# Patient Record
Sex: FEMALE | Race: WHITE | Employment: OTHER | URBAN - METROPOLITAN AREA
[De-identification: names, ages, dates, MRNs, and addresses within clinical notes are randomized per-mention and may not be internally consistent; named-entity substitution may affect disease eponyms.]

---

## 2017-07-11 ENCOUNTER — HOSPITAL ENCOUNTER (OUTPATIENT)
Dept: MRI IMAGING | Age: 67
Discharge: HOME | End: 2017-07-11
Payer: MEDICARE

## 2017-07-11 DIAGNOSIS — M54.16 LUMBAR RADICULOPATHY: ICD-10-CM

## 2017-07-11 DIAGNOSIS — M47.816 LUMBAR SPONDYLOSIS: ICD-10-CM

## 2017-07-11 PROCEDURE — 72148 MRI LUMBAR SPINE W/O DYE: CPT

## 2020-08-12 ENCOUNTER — HOSPITAL ENCOUNTER (OUTPATIENT)
Dept: GENERAL RADIOLOGY | Age: 70
Discharge: HOME | End: 2020-08-12
Payer: MEDICARE

## 2020-08-12 DIAGNOSIS — K62.5 HEMORRHAGE OF RECTUM AND ANUS: ICD-10-CM

## 2020-08-12 DIAGNOSIS — R19.7 DIARRHEA, UNSPECIFIED: ICD-10-CM

## 2020-08-12 DIAGNOSIS — K64.8 INTERNAL HEMORRHOIDS: ICD-10-CM

## 2020-08-12 PROCEDURE — 74018 RADEX ABDOMEN 1 VIEW: CPT

## 2020-10-03 ENCOUNTER — HOSPITAL ENCOUNTER (OUTPATIENT)
Dept: PREADMISSION TESTING | Age: 70
Discharge: HOME | End: 2020-10-03
Payer: MEDICARE

## 2020-10-03 PROCEDURE — 87635 SARS-COV-2 COVID-19 AMP PRB: CPT

## 2020-10-04 LAB
HEALTH STATUS, XMCV2T: NORMAL
SARS-COV-2, COV2NT: NOT DETECTED
SOURCE, COVRS: NORMAL
SPECIMEN SOURCE, FCOV2M: NORMAL
SPECIMEN TYPE, XMCV1T: NORMAL

## 2020-10-06 RX ORDER — ROSUVASTATIN CALCIUM 20 MG/1
20 TABLET, COATED ORAL
COMMUNITY

## 2020-10-06 RX ORDER — LEVOTHYROXINE AND LIOTHYRONINE 19; 4.5 UG/1; UG/1
30 TABLET ORAL DAILY
COMMUNITY

## 2020-10-06 RX ORDER — CETIRIZINE HCL 10 MG
10 TABLET ORAL DAILY
COMMUNITY

## 2020-10-07 ENCOUNTER — HOSPITAL ENCOUNTER (OUTPATIENT)
Age: 70
Setting detail: OUTPATIENT SURGERY
Discharge: HOME | End: 2020-10-07
Attending: INTERNAL MEDICINE | Admitting: INTERNAL MEDICINE
Payer: MEDICARE

## 2020-10-07 ENCOUNTER — ANESTHESIA EVENT (OUTPATIENT)
Dept: ENDOSCOPY | Age: 70
End: 2020-10-07
Payer: MEDICARE

## 2020-10-07 ENCOUNTER — ANESTHESIA (OUTPATIENT)
Dept: ENDOSCOPY | Age: 70
End: 2020-10-07
Payer: MEDICARE

## 2020-10-07 VITALS
BODY MASS INDEX: 25.19 KG/M2 | HEIGHT: 56 IN | WEIGHT: 112 LBS | RESPIRATION RATE: 20 BRPM | SYSTOLIC BLOOD PRESSURE: 155 MMHG | OXYGEN SATURATION: 96 % | DIASTOLIC BLOOD PRESSURE: 60 MMHG | HEART RATE: 72 BPM | TEMPERATURE: 97.8 F

## 2020-10-07 PROCEDURE — 74011250636 HC RX REV CODE- 250/636: Performed by: INTERNAL MEDICINE

## 2020-10-07 PROCEDURE — 88305 TISSUE EXAM BY PATHOLOGIST: CPT

## 2020-10-07 PROCEDURE — 74011250636 HC RX REV CODE- 250/636: Performed by: NURSE ANESTHETIST, CERTIFIED REGISTERED

## 2020-10-07 PROCEDURE — 74011250637 HC RX REV CODE- 250/637: Performed by: INTERNAL MEDICINE

## 2020-10-07 PROCEDURE — 76040000019: Performed by: INTERNAL MEDICINE

## 2020-10-07 PROCEDURE — 74011000250 HC RX REV CODE- 250: Performed by: NURSE ANESTHETIST, CERTIFIED REGISTERED

## 2020-10-07 PROCEDURE — 2709999900 HC NON-CHARGEABLE SUPPLY: Performed by: INTERNAL MEDICINE

## 2020-10-07 PROCEDURE — 76060000031 HC ANESTHESIA FIRST 0.5 HR: Performed by: INTERNAL MEDICINE

## 2020-10-07 RX ORDER — EPINEPHRINE 0.1 MG/ML
1 INJECTION INTRACARDIAC; INTRAVENOUS
Status: DISCONTINUED | OUTPATIENT
Start: 2020-10-07 | End: 2020-10-07 | Stop reason: HOSPADM

## 2020-10-07 RX ORDER — CHOLECALCIFEROL (VITAMIN D3) 50 MCG
CAPSULE ORAL
COMMUNITY

## 2020-10-07 RX ORDER — ATROPINE SULFATE 0.1 MG/ML
0.5 INJECTION INTRAVENOUS
Status: DISCONTINUED | OUTPATIENT
Start: 2020-10-07 | End: 2020-10-07 | Stop reason: HOSPADM

## 2020-10-07 RX ORDER — MIDAZOLAM HYDROCHLORIDE 1 MG/ML
.25-5 INJECTION, SOLUTION INTRAMUSCULAR; INTRAVENOUS
Status: DISCONTINUED | OUTPATIENT
Start: 2020-10-07 | End: 2020-10-07 | Stop reason: HOSPADM

## 2020-10-07 RX ORDER — SODIUM CHLORIDE 0.9 % (FLUSH) 0.9 %
5-40 SYRINGE (ML) INJECTION EVERY 8 HOURS
Status: DISCONTINUED | OUTPATIENT
Start: 2020-10-07 | End: 2020-10-07 | Stop reason: HOSPADM

## 2020-10-07 RX ORDER — LIDOCAINE HYDROCHLORIDE 20 MG/ML
INJECTION, SOLUTION EPIDURAL; INFILTRATION; INTRACAUDAL; PERINEURAL AS NEEDED
Status: DISCONTINUED | OUTPATIENT
Start: 2020-10-07 | End: 2020-10-07 | Stop reason: HOSPADM

## 2020-10-07 RX ORDER — NALOXONE HYDROCHLORIDE 0.4 MG/ML
0.4 INJECTION, SOLUTION INTRAMUSCULAR; INTRAVENOUS; SUBCUTANEOUS
Status: DISCONTINUED | OUTPATIENT
Start: 2020-10-07 | End: 2020-10-07 | Stop reason: HOSPADM

## 2020-10-07 RX ORDER — SODIUM CHLORIDE 9 MG/ML
75 INJECTION, SOLUTION INTRAVENOUS CONTINUOUS
Status: DISCONTINUED | OUTPATIENT
Start: 2020-10-07 | End: 2020-10-07 | Stop reason: HOSPADM

## 2020-10-07 RX ORDER — DEXTROMETHORPHAN/PSEUDOEPHED 2.5-7.5/.8
1.2 DROPS ORAL
Status: DISCONTINUED | OUTPATIENT
Start: 2020-10-07 | End: 2020-10-07 | Stop reason: HOSPADM

## 2020-10-07 RX ORDER — PROPOFOL 10 MG/ML
INJECTION, EMULSION INTRAVENOUS AS NEEDED
Status: DISCONTINUED | OUTPATIENT
Start: 2020-10-07 | End: 2020-10-07 | Stop reason: HOSPADM

## 2020-10-07 RX ORDER — FLUMAZENIL 0.1 MG/ML
0.2 INJECTION INTRAVENOUS
Status: DISCONTINUED | OUTPATIENT
Start: 2020-10-07 | End: 2020-10-07 | Stop reason: HOSPADM

## 2020-10-07 RX ORDER — SODIUM CHLORIDE 0.9 % (FLUSH) 0.9 %
5-40 SYRINGE (ML) INJECTION AS NEEDED
Status: DISCONTINUED | OUTPATIENT
Start: 2020-10-07 | End: 2020-10-07 | Stop reason: HOSPADM

## 2020-10-07 RX ADMIN — PROPOFOL 80 MG: 10 INJECTION, EMULSION INTRAVENOUS at 12:03

## 2020-10-07 RX ADMIN — PROPOFOL 50 MG: 10 INJECTION, EMULSION INTRAVENOUS at 12:09

## 2020-10-07 RX ADMIN — PROPOFOL 40 MG: 10 INJECTION, EMULSION INTRAVENOUS at 12:13

## 2020-10-07 RX ADMIN — LIDOCAINE HYDROCHLORIDE 60 MG: 20 INJECTION, SOLUTION EPIDURAL; INFILTRATION; INTRACAUDAL; PERINEURAL at 12:03

## 2020-10-07 RX ADMIN — Medication 80 MG: at 12:06

## 2020-10-07 RX ADMIN — SODIUM CHLORIDE 75 ML/HR: 900 INJECTION, SOLUTION INTRAVENOUS at 11:48

## 2020-10-07 NOTE — ANESTHESIA PREPROCEDURE EVALUATION
Relevant Problems   No relevant active problems       Anesthetic History   No history of anesthetic complications            Review of Systems / Medical History  Patient summary reviewed, nursing notes reviewed and pertinent labs reviewed    Pulmonary  Within defined limits        Smoker         Neuro/Psych   Within defined limits           Cardiovascular  Within defined limits                Exercise tolerance: >4 METS     GI/Hepatic/Renal  Within defined limits              Endo/Other  Within defined limits    Hypothyroidism       Other Findings              Physical Exam    Airway  Mallampati: II  TM Distance: 4 - 6 cm  Neck ROM: normal range of motion   Mouth opening: Normal     Cardiovascular  Regular rate and rhythm,  S1 and S2 normal,  no murmur, click, rub, or gallop             Dental    Dentition: Lower partial plate     Pulmonary  Breath sounds clear to auscultation               Abdominal  GI exam deferred       Other Findings            Anesthetic Plan    ASA: 2  Anesthesia type: general and total IV anesthesia          Induction: Intravenous  Anesthetic plan and risks discussed with: Patient      Propofol MAC

## 2020-10-07 NOTE — PROGRESS NOTES
[facility] immediately following procedure by Yolande Gallego

## 2020-10-07 NOTE — ANESTHESIA POSTPROCEDURE EVALUATION
Procedure(s):  COLONOSCOPY. general, total IV anesthesia    Anesthesia Post Evaluation        Patient location during evaluation: PACU  Note status: Adequate. Level of consciousness: responsive to verbal stimuli and sleepy but conscious  Pain management: satisfactory to patient  Airway patency: patent  Anesthetic complications: no  Cardiovascular status: acceptable  Respiratory status: acceptable  Hydration status: acceptable  Comments: +Post-Anesthesia Evaluation and Assessment    Patient: Chelsey Mansfield MRN: [id number]  SSN: [id number]   YOB: 1950  Age: 65 y.o. Sex: female      Cardiovascular Function/Vital Signs    BP (!) 155/60   Pulse 72   Temp 36.6 °C (97.8 °F)   Resp 20   Ht 4' 8\" (1.422 m)   Wt 50.8 kg (112 lb)   SpO2 96%   Breastfeeding No   BMI 25.11 kg/m²     Patient is status post Procedure(s):  COLONOSCOPY. Nausea/Vomiting: Controlled. Postoperative hydration reviewed and adequate. Pain:  Pain Scale 1: Numeric (0 - 10) (10/07/20 1239)  Pain Intensity 1: 0 (10/07/20 1239)   Managed. Neurological Status: At baseline. Mental Status and Level of Consciousness: Arousable. Pulmonary Status:   O2 Device: Room air (10/07/20 1234)   Adequate oxygenation and airway patent. Complications related to anesthesia: None    Post-anesthesia assessment completed. No concerns. Signed By: Leland Christian MD    10/7/2020  Post anesthesia nausea and vomiting:  controlled      INITIAL Post-op Vital signs:   Vitals Value Taken Time   /60 10/7/2020 12:39 PM   Temp 36.6 °C (97.8 °F) 10/7/2020 12:34 PM   Pulse 72 10/7/2020 12:39 PM   Resp 20 10/7/2020 12:39 PM   SpO2 94 % 10/7/2020 12:38 PM   Vitals shown include unvalidated device data.

## 2020-10-07 NOTE — H&P
Pre-endoscopy H and P    The patient was seen and examined in the room/pre-op holding area. The airway was assessed and documented. The problem list, past medical history, and medications were reviewed. There is no problem list on file for this patient. Social History     Socioeconomic History    Marital status: SINGLE     Spouse name: Not on file    Number of children: Not on file    Years of education: Not on file    Highest education level: Not on file   Occupational History    Not on file   Social Needs    Financial resource strain: Not on file    Food insecurity     Worry: Not on file     Inability: Not on file    Transportation needs     Medical: Not on file     Non-medical: Not on file   Tobacco Use    Smoking status: Current Every Day Smoker     Packs/day: 0.75    Smokeless tobacco: Never Used   Substance and Sexual Activity    Alcohol use: Yes     Alcohol/week: 4.0 standard drinks     Types: 4 Glasses of wine per week    Drug use: Not Currently    Sexual activity: Not on file   Lifestyle    Physical activity     Days per week: Not on file     Minutes per session: Not on file    Stress: Not on file   Relationships    Social connections     Talks on phone: Not on file     Gets together: Not on file     Attends Evangelical service: Not on file     Active member of club or organization: Not on file     Attends meetings of clubs or organizations: Not on file     Relationship status: Not on file    Intimate partner violence     Fear of current or ex partner: Not on file     Emotionally abused: Not on file     Physically abused: Not on file     Forced sexual activity: Not on file   Other Topics Concern    Not on file   Social History Narrative    Not on file     Past Medical History:   Diagnosis Date    High cholesterol     Thyroid disease          Prior to Admission Medications   Prescriptions Last Dose Informant Patient Reported? Taking?    B.infantis-B.ani-B.long-B.bifi (Probiotic 4X) 10-15 mg TbEC 10/6/2020 at Unknown time  Yes Yes   Sig: Take  by mouth. cetirizine (ZyrTEC) 10 mg tablet 10/7/2020 at Unknown time  Yes Yes   Sig: Take 10 mg by mouth daily. rosuvastatin (Crestor) 20 mg tablet 10/7/2020 at Unknown time  Yes Yes   Sig: Take 20 mg by mouth nightly. thyroid, Pork, (Mayflower Thyroid) 30 mg tablet 10/7/2020 at Unknown time  Yes Yes   Sig: Take 30 mg by mouth daily. Facility-Administered Medications: None           The review of systems is:  Negative  for shortness of breath or chest pain      The heart, lungs, and mental status were satisfactory for the administration of deep sedation and for the procedure. I discussed with the patient the objectives, risks, consequences and alternatives to the procedure.       Ava Tellez MD  10/7/2020  11:50 AM

## 2020-10-07 NOTE — PROCEDURES
Colonoscopy Procedure Note    Garth Lazaro  1950  [id number]    Indications:  Please see below. Pre-operative Diagnosis: DIARRHEA  HEMORRHAGE OF ANUS AND RECTUM  INTERNAL HEMORRHOIDS    Post-operative Diagnosis:   Diverticulosis,  Large internal hemorrhoids,  Rectal mucosal prolapse,  Ulcer  rectum    : Red Townsend MD    Referring Provider: Valdo Aguirre MD    Sedation:  MAC anesthesia Propofol        Procedure Details:    After detailed informed consent was obtained with all risks and benefits of procedure explained and preoperative exam completed, the patient was taken to the endoscopy suite and placed in the left lateral decubitus position. Upon sequential sedation as per above, a digital rectal exam was performed  And was normal.  The Olympus videocolonoscope  was inserted in the rectum and carefully advanced to the cecum, which was identified by the ileocecal valve and appendiceal orifice. The quality of preparation was good. The colonoscope was slowly withdrawn with careful evaluation between folds. Retroflexion in the rectum was not performed(see below). Findings:   · Scope passed to cecum. The colonic mucosa is normal appearing. I took mucosal biopsies to make sure she has no microscopic colitis. · Mild diverticulosis noted in the sigmoid. · Large internal hemorrhoids with ulcerations at the proximal edges(? Proctitis). I took a single biopsy to rule out stercoral ulcer. · Rectal mucosa is difficult to see as mucosa prolapses. Therapies:  biopsy of colon cecum, ascending colon, descending colon, sigmoid colon and rectum(ulcer)    Specimen:  Specimens were collected as described above and sent to pathology. Complications: None were encountered during the procedure. EBL:  None. Recommendations:     -Await pathology.  -Follow up with primary care physician.  -Follow up in the office as scheduled. -Treat hemorrhoids. Red Townsend MD  10/7/2020  12:20 PM

## 2020-10-07 NOTE — DISCHARGE INSTRUCTIONS
[place] Office: [phone]    Anh Bond  [id number]  1950    EGD/COLONOSCOPY DISCHARGE INSTRUCTIONS  Discomfort:  Sore throat- throat lozenges or warm salt water gargle  redness at IV site- apply warm compress to area; if redness or soreness persist- contact your physician  Gaseous discomfort- walking, belching will help relieve any discomfort  You may not operate a vehicle for 12 hours  You may not engage in an occupation involving machinery or appliances for rest of today. You may not drink alcoholic beverages for at least 12 hours  Avoid making any critical decisions for at least 24 hour  DIET  You may resume your regular diet - however -  remember your colon is empty and a heavy meal will produce gas. Avoid these foods:  fried / greasy foods, excessive carbonated drinks or too much caffeine  MEDICATIONS   Regarding Aspirin or Nonsteroidal medications specifically, please see below. ACTIVITY  You may resume your normal daily activities. Spend the remainder of the day resting -  avoid any strenuous activity. CALL M.D. ANY SIGN OF   Increasing pain, nausea, vomiting  Abdominal distension (swelling)  New increased bleeding (oral or rectal)  Fever (chills)  Pain in chest area  Bloody discharge from nose or mouth  Shortness of breath    You may not take any Advil, Aspirin, Ibuprofen, Motrin, Aleve, or Goodys for 7 days, ONLY  Tylenol as needed for pain. Follow-up Instructions:   Call  Red Armenta MD for any questions or concerns  Results of procedure / biopsy in 7 days   Telephone # [phone]      Follow-up Information    None

## 2020-10-07 NOTE — PROGRESS NOTES
Patient given verbal and written discharge instructions. Patient given opportunity to ask questions and answers given. Patient able to verbalize understanding of these instructions.

## 2021-09-24 ENCOUNTER — TRANSCRIBE ORDER (OUTPATIENT)
Dept: SCHEDULING | Age: 71
End: 2021-09-24

## 2021-09-24 DIAGNOSIS — I73.9 CLAUDICATION (HCC): Primary | ICD-10-CM

## 2021-10-18 ENCOUNTER — HOSPITAL ENCOUNTER (OUTPATIENT)
Dept: VASCULAR SURGERY | Age: 71
Discharge: HOME | End: 2021-10-18
Attending: PODIATRIST
Payer: MEDICARE

## 2021-10-18 DIAGNOSIS — I73.9 CLAUDICATION (HCC): ICD-10-CM

## 2021-10-18 PROCEDURE — 93923 UPR/LXTR ART STDY 3+ LVLS: CPT

## 2021-10-19 LAB
LEFT ABI: 0.96
LEFT ANTERIOR TIBIAL: 123 MMHG
LEFT ARM BP: 128 MMHG
LEFT POSTERIOR TIBIAL: 114 MMHG
RIGHT ABI: 1.06
RIGHT ANTERIOR TIBIAL: 124 MMHG
RIGHT ARM BP: 118 MMHG
RIGHT POSTERIOR TIBIAL: 136 MMHG

## 2023-04-07 ENCOUNTER — TRANSCRIBE ORDER (OUTPATIENT)
Dept: SCHEDULING | Age: 73
End: 2023-04-07

## 2023-04-17 ENCOUNTER — HOSPITAL ENCOUNTER (OUTPATIENT)
Dept: VASCULAR SURGERY | Age: 73
Discharge: HOME | End: 2023-04-17
Attending: PODIATRIST
Payer: MEDICARE

## 2023-04-17 DIAGNOSIS — I73.9 PERIPHERAL VASCULAR DISEASE, UNSPECIFIED (HCC): ICD-10-CM

## 2023-04-17 PROCEDURE — 93922 UPR/L XTREMITY ART 2 LEVELS: CPT

## 2023-04-19 LAB
LEFT ABI: 0.94
LEFT ARM BP: 130 MMHG
LEFT POSTERIOR TIBIAL: 122 MMHG
LEFT TBI: 0.95
LEFT TOE PRESSURE: 123 MMHG
RIGHT ABI: 0.98
RIGHT ARM BP: 123 MMHG
RIGHT POSTERIOR TIBIAL: 127 MMHG
RIGHT TBI: 0.88
RIGHT TOE PRESSURE: 114 MMHG
VAS LEFT DORSALIS PEDIS BP: 107 MMHG
VAS RIGHT DORSALIS PEDIS BP: 113 MMHG

## 2023-05-11 RX ORDER — LEVOTHYROXINE AND LIOTHYRONINE 19; 4.5 UG/1; UG/1
30 TABLET ORAL DAILY
COMMUNITY

## 2023-05-11 RX ORDER — ROSUVASTATIN CALCIUM 20 MG/1
20 TABLET, COATED ORAL NIGHTLY
COMMUNITY

## 2023-05-11 RX ORDER — CETIRIZINE HYDROCHLORIDE 10 MG/1
10 TABLET ORAL DAILY
COMMUNITY

## 2023-11-29 ENCOUNTER — HOSPITAL ENCOUNTER (OUTPATIENT)
Facility: HOSPITAL | Age: 73
Discharge: HOME | End: 2023-11-29
Attending: FAMILY MEDICINE
Payer: MEDICARE

## 2023-11-29 VITALS
DIASTOLIC BLOOD PRESSURE: 65 MMHG | TEMPERATURE: 98 F | HEART RATE: 94 BPM | SYSTOLIC BLOOD PRESSURE: 103 MMHG | RESPIRATION RATE: 15 BRPM

## 2023-11-29 DIAGNOSIS — L97.512 RIGHT FOOT ULCER, WITH FAT LAYER EXPOSED (HCC): ICD-10-CM

## 2023-11-29 DIAGNOSIS — L97.312 ANKLE ULCER, RIGHT, WITH FAT LAYER EXPOSED (HCC): Primary | ICD-10-CM

## 2023-11-29 DIAGNOSIS — F17.210 CIGARETTE NICOTINE DEPENDENCE WITHOUT COMPLICATION: ICD-10-CM

## 2023-11-29 PROBLEM — F17.200 NICOTINE DEPENDENCE: Status: ACTIVE | Noted: 2023-11-29

## 2023-11-29 PROCEDURE — 99203 OFFICE O/P NEW LOW 30 MIN: CPT

## 2023-11-29 PROCEDURE — 11042 DBRDMT SUBQ TIS 1ST 20SQCM/<: CPT

## 2023-11-29 RX ORDER — ALBUTEROL SULFATE 90 UG/1
2 AEROSOL, METERED RESPIRATORY (INHALATION) EVERY 6 HOURS PRN
COMMUNITY

## 2023-11-29 RX ORDER — CHOLECALCIFEROL (VITAMIN D3) 25 MCG
1 TABLET,CHEWABLE ORAL
COMMUNITY

## 2023-11-29 NOTE — PROGRESS NOTES
0.15 cm^2 11/29/23 1428   Wound Volume (cm^3) 0.015 cm^3 11/29/23 1428   Post-Procedure Length (cm) 0.5 cm 11/29/23 1502   Post-Procedure Width (cm) 0.3 cm 11/29/23 1502   Post-Procedure Depth (cm) 0.3 cm 11/29/23 1502   Post-Procedure Surface Area (cm^2) 0.15 cm^2 11/29/23 1502   Post-Procedure Volume (cm^3) 0.045 cm^3 11/29/23 1502   Wound Assessment Pink/red;Slough 11/29/23 1428   Drainage Amount Scant (moist but unmeasurable) 11/29/23 1428   Drainage Description Serous 11/29/23 1428   Odor None 11/29/23 1428   Mirian-wound Assessment Intact 11/29/23 1428   Margins Defined edges 11/29/23 1428   Wound Thickness Description not for Pressure Injury Full thickness 11/29/23 1428   Number of days: 0          -------    Past Medical History:   Diagnosis Date    High cholesterol     Thyroid disease      Past Surgical History:   Procedure Laterality Date    COLONOSCOPY N/A 10/7/2020    COLONOSCOPY performed by Harinder Keen MD at [facility] ENDOSCOPY    HYSTERECTOMY (CERVIX STATUS UNKNOWN)      ORTHOPEDIC SURGERY      carpal tunnel      History reviewed. No pertinent family history. Social History     Socioeconomic History    Marital status: Single     Spouse name: None    Number of children: None    Years of education: None    Highest education level: None   Tobacco Use    Smoking status: Every Day     Packs/day: .5     Types: Cigarettes    Smokeless tobacco: Never   Substance and Sexual Activity    Alcohol use: Yes     Alcohol/week: 4.0 standard drinks of alcohol    Drug use: Not Currently        Prior to Admission medications    Medication Sig Start Date End Date Taking?  Authorizing Provider   Cholecalciferol (VITAMIN D3) 125 MCG (5000 UT) TABS Take 1 tablet by mouth   Yes ProviderFranky MD   Cyanocobalamin (B-12) 3000 MCG CAPS Take 1 Dose by mouth   Yes Franky Carrillo MD   albuterol sulfate HFA (VENTOLIN HFA) 108 (90 Base) MCG/ACT inhaler Inhale 2 puffs into the lungs every 6 hours as needed for Wheezing

## 2023-11-29 NOTE — PATIENT INSTRUCTIONS
Discharge Instructions for  [address], [address]  Phone: [phone] Fax: [phone]    NAME:  Mitesh Butt  YOB: 1950  MEDICAL RECORD NUMBER:  [id number]  DATE:  November 29, 2023  WOUND CARE ORDERS:  Right ankle wounds - Cleanse with baby shampoo. Rinse and pat dry. Apply santyl ointment the thickness of a nickel to wound bed. Cover with dry gauze and secure with tape. Change every day. Activity:  [x] Elevate leg(s) above the level of the heart when sitting. [x] Avoid prolonged standing in one place. [x] Do no get dressing/wrap wet. Dietary:  [x] Diet as tolerated      [] Diabetic Diet            [] Increase Protein: examples (Meat, cheese, eggs, greek yogurt, fish, nuts)          [] Дмитрий Therapeutic Nutrition Powder    Return Appointment:  [x] Return Appointment: With Dr. Amy Lambert in 1 week. [] Nurse Visit :   [] Ordered tests:     Electronically signed Edwina Mckeon RN on 11/29/2023 at 3:03 PM     [address]: Should you experience any significant changes in your wound(s) or have questions about your wound care, please contact the [address] [place] [address] at 1 [facility] 8:00 am - 4:30. If you need help with your wound outside these hours and cannot wait until we are again available, contact your PCP or go to the hospital emergency room. PLEASE NOTE: IF YOU ARE UNABLE TO OBTAIN WOUND SUPPLIES, CONTINUE TO USE THE SUPPLIES YOU HAVE AVAILABLE UNTIL YOU ARE ABLE TO REACH US. IT IS MOST IMPORTANT TO KEEP THE WOUND COVERED AT ALL TIMES.      Physician Signature:_______________________    Date: ___________ Time:  ____________

## 2023-11-29 NOTE — FLOWSHEET NOTE
11/29/23 1428   Anesthetic   Anesthetic 4% Lidocaine Cream   Right Leg Edema Point of Measurement   Leg circumference 37 cm   Ankle circumference 22 cm   RLE Neurovascular Assessment   Capillary Refill Less than/Equal to 3 seconds   Color Appropriate for Ethnicity   Temperature Warm   RLE Sensation  Full sensation   R Pedal Pulse +2   LLE Neurovascular Assessment   Capillary Refill Less than/Equal to 3 seconds   Color Appropriate for Ethnicity   Temperature Warm   L Pedal Pulse +2   Wound 11/29/23 Ankle Right; Anterior   Date First Assessed/Time First Assessed: 11/29/23 1426   Present on Original Admission: Yes  Wound Approximate Age at First Assessment (Weeks): 40 weeks  Location: Ankle  Wound Location Orientation: Right; Anterior   Wound Image    Dressing Status Intact   Wound Cleansed Cleansed with saline   Offloading for Diabetic Foot Ulcers Offloading not ordered   Wound Length (cm) 0.6 cm   Wound Width (cm) 1 cm   Wound Depth (cm) 0.1 cm   Wound Surface Area (cm^2) 0.6 cm^2   Wound Volume (cm^3) 0.06 cm^3   Wound Assessment [place]/red;Slough   Drainage Amount Scant (moist but unmeasurable)   Drainage Description Serous   Mirian-wound Assessment Intact   Margins Defined edges   Wound Thickness Description not for Pressure Injury Full thickness   Wound 11/29/23 Foot Right; Anterior   Date First Assessed/Time First Assessed: 11/29/23 1427   Present on Original Admission: Yes  Wound Approximate Age at First Assessment (Weeks): 40 weeks  Location: Foot  Wound Location Orientation: Right; Anterior   Dressing Status Intact   Wound Cleansed Cleansed with saline   Offloading for Diabetic Foot Ulcers Offloading not ordered   Wound Length (cm) 0.5 cm   Wound Width (cm) 0.3 cm   Wound Depth (cm) 0.1 cm   Wound Surface Area (cm^2) 0.15 cm^2   Wound Volume (cm^3) 0.015 cm^3   Wound Assessment [place]/red;Slough   Drainage Amount Scant (moist but unmeasurable)   Drainage Description Serous   Odor None   Mirian-wound Assessment Intact

## 2023-11-30 RX ORDER — TRIAMCINOLONE ACETONIDE 1 MG/G
OINTMENT TOPICAL ONCE
OUTPATIENT
Start: 2023-11-30 | End: 2023-11-30

## 2023-11-30 RX ORDER — LIDOCAINE 50 MG/G
OINTMENT TOPICAL ONCE
OUTPATIENT
Start: 2023-11-30 | End: 2023-11-30

## 2023-11-30 RX ORDER — TRIAMCINOLONE ACETONIDE 1 MG/G
OINTMENT TOPICAL ONCE
Status: CANCELLED | OUTPATIENT
Start: 2023-11-30 | End: 2023-11-30

## 2023-11-30 RX ORDER — CLOBETASOL PROPIONATE 0.5 MG/G
OINTMENT TOPICAL ONCE
OUTPATIENT
Start: 2023-11-30 | End: 2023-11-30

## 2023-11-30 RX ORDER — BETAMETHASONE DIPROPIONATE 0.5 MG/G
CREAM TOPICAL ONCE
Status: CANCELLED | OUTPATIENT
Start: 2023-11-30 | End: 2023-11-30

## 2023-11-30 RX ORDER — LIDOCAINE HYDROCHLORIDE 20 MG/ML
JELLY TOPICAL ONCE
Status: CANCELLED | OUTPATIENT
Start: 2023-11-30 | End: 2023-11-30

## 2023-11-30 RX ORDER — LIDOCAINE 40 MG/G
CREAM TOPICAL ONCE
Status: CANCELLED | OUTPATIENT
Start: 2023-11-30 | End: 2023-11-30

## 2023-11-30 RX ORDER — IBUPROFEN 200 MG
TABLET ORAL ONCE
Status: CANCELLED | OUTPATIENT
Start: 2023-11-30 | End: 2023-11-30

## 2023-11-30 RX ORDER — GINSENG 100 MG
CAPSULE ORAL ONCE
Status: CANCELLED | OUTPATIENT
Start: 2023-11-30 | End: 2023-11-30

## 2023-11-30 RX ORDER — GINSENG 100 MG
CAPSULE ORAL ONCE
OUTPATIENT
Start: 2023-11-30 | End: 2023-11-30

## 2023-11-30 RX ORDER — SODIUM CHLOR/HYPOCHLOROUS ACID 0.033 %
SOLUTION, IRRIGATION IRRIGATION ONCE
Status: CANCELLED | OUTPATIENT
Start: 2023-11-30 | End: 2023-11-30

## 2023-11-30 RX ORDER — BACITRACIN ZINC AND POLYMYXIN B SULFATE 500; 1000 [USP'U]/G; [USP'U]/G
OINTMENT TOPICAL ONCE
OUTPATIENT
Start: 2023-11-30 | End: 2023-11-30

## 2023-11-30 RX ORDER — SODIUM CHLOR/HYPOCHLOROUS ACID 0.033 %
SOLUTION, IRRIGATION IRRIGATION ONCE
OUTPATIENT
Start: 2023-11-30 | End: 2023-11-30

## 2023-11-30 RX ORDER — LIDOCAINE HYDROCHLORIDE 20 MG/ML
JELLY TOPICAL ONCE
OUTPATIENT
Start: 2023-11-30 | End: 2023-11-30

## 2023-11-30 RX ORDER — BETAMETHASONE DIPROPIONATE 0.5 MG/G
CREAM TOPICAL ONCE
OUTPATIENT
Start: 2023-11-30 | End: 2023-11-30

## 2023-11-30 RX ORDER — LIDOCAINE 50 MG/G
OINTMENT TOPICAL ONCE
Status: CANCELLED | OUTPATIENT
Start: 2023-11-30 | End: 2023-11-30

## 2023-11-30 RX ORDER — BACITRACIN ZINC AND POLYMYXIN B SULFATE 500; 1000 [USP'U]/G; [USP'U]/G
OINTMENT TOPICAL ONCE
Status: CANCELLED | OUTPATIENT
Start: 2023-11-30 | End: 2023-11-30

## 2023-11-30 RX ORDER — GENTAMICIN SULFATE 1 MG/G
OINTMENT TOPICAL ONCE
Status: CANCELLED | OUTPATIENT
Start: 2023-11-30 | End: 2023-11-30

## 2023-11-30 RX ORDER — CLOBETASOL PROPIONATE 0.5 MG/G
OINTMENT TOPICAL ONCE
Status: CANCELLED | OUTPATIENT
Start: 2023-11-30 | End: 2023-11-30

## 2023-11-30 RX ORDER — LIDOCAINE HYDROCHLORIDE 40 MG/ML
SOLUTION TOPICAL ONCE
Status: CANCELLED | OUTPATIENT
Start: 2023-11-30 | End: 2023-11-30

## 2023-11-30 RX ORDER — LIDOCAINE 40 MG/G
CREAM TOPICAL ONCE
OUTPATIENT
Start: 2023-11-30 | End: 2023-11-30

## 2023-11-30 RX ORDER — GENTAMICIN SULFATE 1 MG/G
OINTMENT TOPICAL ONCE
OUTPATIENT
Start: 2023-11-30 | End: 2023-11-30

## 2023-11-30 RX ORDER — IBUPROFEN 200 MG
TABLET ORAL ONCE
OUTPATIENT
Start: 2023-11-30 | End: 2023-11-30

## 2023-11-30 RX ORDER — LIDOCAINE HYDROCHLORIDE 40 MG/ML
SOLUTION TOPICAL ONCE
OUTPATIENT
Start: 2023-11-30 | End: 2023-11-30

## 2023-12-06 ENCOUNTER — HOSPITAL ENCOUNTER (OUTPATIENT)
Facility: HOSPITAL | Age: 73
Discharge: HOME | End: 2023-12-06
Attending: FAMILY MEDICINE
Payer: MEDICARE

## 2023-12-06 VITALS
RESPIRATION RATE: 16 BRPM | TEMPERATURE: 98.2 F | DIASTOLIC BLOOD PRESSURE: 75 MMHG | SYSTOLIC BLOOD PRESSURE: 126 MMHG | HEART RATE: 94 BPM

## 2023-12-06 DIAGNOSIS — L97.512 RIGHT FOOT ULCER, WITH FAT LAYER EXPOSED (HCC): ICD-10-CM

## 2023-12-06 DIAGNOSIS — L97.312 ANKLE ULCER, RIGHT, WITH FAT LAYER EXPOSED (HCC): Primary | ICD-10-CM

## 2023-12-06 PROCEDURE — 11042 DBRDMT SUBQ TIS 1ST 20SQCM/<: CPT

## 2023-12-06 RX ORDER — LIDOCAINE HYDROCHLORIDE 20 MG/ML
JELLY TOPICAL ONCE
OUTPATIENT
Start: 2023-12-06 | End: 2023-12-06

## 2023-12-06 RX ORDER — BETAMETHASONE DIPROPIONATE 0.5 MG/G
CREAM TOPICAL ONCE
OUTPATIENT
Start: 2023-12-06 | End: 2023-12-06

## 2023-12-06 RX ORDER — CLOBETASOL PROPIONATE 0.5 MG/G
OINTMENT TOPICAL ONCE
OUTPATIENT
Start: 2023-12-06 | End: 2023-12-06

## 2023-12-06 RX ORDER — IBUPROFEN 200 MG
TABLET ORAL ONCE
OUTPATIENT
Start: 2023-12-06 | End: 2023-12-06

## 2023-12-06 RX ORDER — GINSENG 100 MG
CAPSULE ORAL ONCE
OUTPATIENT
Start: 2023-12-06 | End: 2023-12-06

## 2023-12-06 RX ORDER — BACITRACIN ZINC AND POLYMYXIN B SULFATE 500; 1000 [USP'U]/G; [USP'U]/G
OINTMENT TOPICAL ONCE
OUTPATIENT
Start: 2023-12-06 | End: 2023-12-06

## 2023-12-06 RX ORDER — LIDOCAINE 40 MG/G
CREAM TOPICAL ONCE
OUTPATIENT
Start: 2023-12-06 | End: 2023-12-06

## 2023-12-06 RX ORDER — LIDOCAINE HYDROCHLORIDE 40 MG/ML
SOLUTION TOPICAL ONCE
OUTPATIENT
Start: 2023-12-06 | End: 2023-12-06

## 2023-12-06 RX ORDER — LIDOCAINE 50 MG/G
OINTMENT TOPICAL ONCE
OUTPATIENT
Start: 2023-12-06 | End: 2023-12-06

## 2023-12-06 RX ORDER — GENTAMICIN SULFATE 1 MG/G
OINTMENT TOPICAL ONCE
OUTPATIENT
Start: 2023-12-06 | End: 2023-12-06

## 2023-12-06 RX ORDER — SODIUM CHLOR/HYPOCHLOROUS ACID 0.033 %
SOLUTION, IRRIGATION IRRIGATION ONCE
OUTPATIENT
Start: 2023-12-06 | End: 2023-12-06

## 2023-12-06 RX ORDER — TRIAMCINOLONE ACETONIDE 1 MG/G
OINTMENT TOPICAL ONCE
OUTPATIENT
Start: 2023-12-06 | End: 2023-12-06

## 2023-12-06 NOTE — DISCHARGE INSTRUCTIONS
Discharge Instructions/Wound Orders  [address], [address]   Telephone: [phone]    NAME:  Anu Rodríguez [facility] Street:  1950  MEDICAL RECORD NUMBER:  [id number]  DATE:  December 6, 2023  Wound Care Orders:  Right foot :Cleanse with soap and water , apply primary dressing Santyl applied Nickel thickness cover with secondary dressing   Gauze and Tape  . Pt./pcg/HH nurse to change (freq) Daily and as needed for compromise. F/U in 2 week. Activity:  [x] Activity as tolerated:     [] Remain off Work:       [] May return to full duty work:                                     [] Return to work with restrictions:  Dietary:  [x] Diet as tolerated      [] Diabetic Diet            [] Increase Protein: examples (Meat, cheese, eggs, greek yogurt, fish, nuts)          [] [address]  [] Other:  [] Dial a Dietician : Call [facility] [place] at [phone] enter code [phone] when prompted. M-F 9am-5pm EST. Return Appointment:  [x] Return Appointment: With Dr. Linda Doe in 2 [facility])  [] Ordered tests:    Electronically signed Beatrice Sharpe RN on 12/6/2023 at 2:39 PM     [address] [place] [address] Information: Should you experience any significant changes in your wound(s) or have questions about your wound care, please contact the [address] [place] [address] [facility] 8:00 am - 4:30. If you need help with your wound outside these hours and cannot wait until we are again available, contact your PCP or go to the hospital emergency room. PLEASE NOTE: IF YOU ARE UNABLE TO OBTAIN WOUND SUPPLIES, CONTINUE TO USE THE SUPPLIES YOU HAVE AVAILABLE UNTIL YOU ARE ABLE TO REACH US. IT IS MOST IMPORTANT TO KEEP THE WOUND COVERED AT ALL TIMES.      Physician Signature:_______________________    Date: ___________ Time:  ____________

## 2024-01-03 ENCOUNTER — HOSPITAL ENCOUNTER (OUTPATIENT)
Facility: HOSPITAL | Age: 74
Discharge: HOME | End: 2024-01-03
Attending: FAMILY MEDICINE
Payer: MEDICARE

## 2024-01-03 VITALS
SYSTOLIC BLOOD PRESSURE: 105 MMHG | RESPIRATION RATE: 15 BRPM | TEMPERATURE: 98 F | DIASTOLIC BLOOD PRESSURE: 70 MMHG | HEART RATE: 105 BPM

## 2024-01-03 DIAGNOSIS — L97.312 ANKLE ULCER, RIGHT, WITH FAT LAYER EXPOSED (HCC): Primary | ICD-10-CM

## 2024-01-03 DIAGNOSIS — L97.512 RIGHT FOOT ULCER, WITH FAT LAYER EXPOSED (HCC): ICD-10-CM

## 2024-01-03 PROCEDURE — 87070 CULTURE OTHR SPECIMN AEROBIC: CPT

## 2024-01-03 PROCEDURE — 87077 CULTURE AEROBIC IDENTIFY: CPT

## 2024-01-03 PROCEDURE — 11043 DBRDMT MUSC&/FSCA 1ST 20/<: CPT

## 2024-01-03 PROCEDURE — 87186 SC STD MICRODIL/AGAR DIL: CPT

## 2024-01-03 PROCEDURE — 87205 SMEAR GRAM STAIN: CPT

## 2024-01-03 PROCEDURE — 11042 DBRDMT SUBQ TIS 1ST 20SQCM/<: CPT

## 2024-01-03 RX ORDER — LIDOCAINE 50 MG/G
OINTMENT TOPICAL ONCE
OUTPATIENT
Start: 2024-01-03 | End: 2024-01-03

## 2024-01-03 RX ORDER — BACITRACIN ZINC AND POLYMYXIN B SULFATE 500; 1000 [USP'U]/G; [USP'U]/G
OINTMENT TOPICAL ONCE
OUTPATIENT
Start: 2024-01-03 | End: 2024-01-03

## 2024-01-03 RX ORDER — BETAMETHASONE DIPROPIONATE 0.5 MG/G
CREAM TOPICAL ONCE
OUTPATIENT
Start: 2024-01-03 | End: 2024-01-03

## 2024-01-03 RX ORDER — LIDOCAINE 40 MG/G
CREAM TOPICAL ONCE
OUTPATIENT
Start: 2024-01-03 | End: 2024-01-03

## 2024-01-03 RX ORDER — GENTAMICIN SULFATE 1 MG/G
OINTMENT TOPICAL ONCE
OUTPATIENT
Start: 2024-01-03 | End: 2024-01-03

## 2024-01-03 RX ORDER — DOXYCYCLINE HYCLATE 100 MG
100 TABLET ORAL 2 TIMES DAILY
Qty: 14 TABLET | Refills: 0 | Status: SHIPPED | OUTPATIENT
Start: 2024-01-03 | End: 2024-01-10

## 2024-01-03 RX ORDER — CLOBETASOL PROPIONATE 0.5 MG/G
OINTMENT TOPICAL ONCE
OUTPATIENT
Start: 2024-01-03 | End: 2024-01-03

## 2024-01-03 RX ORDER — LIDOCAINE HYDROCHLORIDE 20 MG/ML
JELLY TOPICAL ONCE
OUTPATIENT
Start: 2024-01-03 | End: 2024-01-03

## 2024-01-03 RX ORDER — TRIAMCINOLONE ACETONIDE 1 MG/G
OINTMENT TOPICAL ONCE
OUTPATIENT
Start: 2024-01-03 | End: 2024-01-03

## 2024-01-03 RX ORDER — SODIUM CHLOR/HYPOCHLOROUS ACID 0.033 %
SOLUTION, IRRIGATION IRRIGATION ONCE
OUTPATIENT
Start: 2024-01-03 | End: 2024-01-03

## 2024-01-03 RX ORDER — LIDOCAINE HYDROCHLORIDE 40 MG/ML
SOLUTION TOPICAL ONCE
OUTPATIENT
Start: 2024-01-03 | End: 2024-01-03

## 2024-01-03 RX ORDER — GINSENG 100 MG
CAPSULE ORAL ONCE
OUTPATIENT
Start: 2024-01-03 | End: 2024-01-03

## 2024-01-03 RX ORDER — IBUPROFEN 200 MG
TABLET ORAL ONCE
OUTPATIENT
Start: 2024-01-03 | End: 2024-01-03

## 2024-01-03 NOTE — FLOWSHEET NOTE
01/03/24 1659   Wound 11/29/23 Ankle Right;Anterior #1   Date First Assessed/Time First Assessed: 11/29/23 1426   Present on Original Admission: Yes  Wound Approximate Age at First Assessment (Weeks): 40 weeks  Location: Ankle  Wound Location Orientation: Right;Anterior  Wound Description (Comments): #1   Dressing/Treatment   (santyl, gauze, tape)   Offloading for Diabetic Foot Ulcers Offloading not ordered   Wound 11/29/23 Foot Right;Anterior   Date First Assessed/Time First Assessed: 11/29/23 1427   Present on Original Admission: Yes  Wound Approximate Age at First Assessment (Weeks): 40 weeks  Location: Foot  Wound Location Orientation: Right;Anterior   Dressing/Treatment   (santyl, gauze, tape)   Offloading for Diabetic Foot Ulcers Offloading not ordered

## 2024-01-03 NOTE — PROGRESS NOTES
Wound Center  Progress Note / Procedure Note      Chief Complaint:  Chanelle Clay is a 73 y.o. female  with Right foot and ankle wound since February 2023      Assessment/Plan     73 y.o. female with     -R ankle anterior chronic ulcer.  Full thickness  Bigger, more painful  Slough /granular  Necessitates debridement  for wound healing and to prevent/heal infection  Ulcer needs debridement- see note below    -R foot anterior chronic ulcer.  Full thickness  Bigger, more painful  All necrotic  Necessitates debridement  for wound healing and to prevent/heal infection  Ulcer needs debridement- see note below    Culture of both wounds done  Start doxycycline until results of culture avail    Leg swelling  Elevate  compression    -Nicotine dependence  Effects of nicotine on wound healing discussed      -Vascular  Normal ABIs/TBIs/ in April      Following discussed with patient / sister  Needs :  Serial debridement- prn    Good local wound care  Dressing:santyl  Frequency : once daily   Tubis x 2      Patient/  understood and agrees with plan. Questions answered.    Follow up with me in 2 week    Subjective:   Since last visit  More swelling - has been on her feet more  Also more sensitive      HPI:     Wound since Feb   Acquired from having laser therapy on feet for pain  Dr Mancini's office did not think it was due to laser  Being treated there since then  Not healing so came here  Has tried collagen, iodine and some other products    Has been on 2 course of abx, last one 4 weeks ago    History/Chart/Medications reviewed    Current wound care:See flowsheet  Offloading wound: Yes  Appetite: good  Wound associated pain: See flowsheet  Diabetic: no  Smoker: yes  ROS: no N/V/D, no T/chills; no local rash, no chest pain or shortness of breath, no headache or dizzyness      Objective:     Physical Exam:   See flowsheet / nursing notes for vitals  Vitals:    01/03/24 1415   BP: 105/70   Pulse: (!) 105   Resp: 15   Temp: 98

## 2024-01-03 NOTE — PATIENT INSTRUCTIONS
Discharge Instructions for  [facility]  [address] [address]  Phone: [phone] Fax: [phone]     NAME:  Chanelle Clay  YOB: 1950  MEDICAL RECORD NUMBER:  [id number]  DATE:  January 3, 2024    WOUND CARE ORDERS:  Right ankle wounds - Cleanse with baby shampoo. Rinse and pat dry. Apply santyl ointment the thickness of a nickel to wound bed. Cover with dry gauze and secure with tape. Apply double layer of tubigrip size E for mild compression. Change every day.      Activity:  [x] Elevate leg(s) above the level of the heart when sitting.  [x] Avoid prolonged standing in one place.   [x] Do no get dressing/wrap wet.             Dietary:  [x] Diet as tolerated      [] Diabetic Diet            [] Increase Protein: examples (Meat, cheese, eggs, greek yogurt, fish, nuts)          [] Дмитрий Therapeutic Nutrition Powder     Return Appointment:  [x] Return Appointment: With Dr. Eli Gill in 1 week.   [] Nurse Visit :   [] Ordered tests:      Electronically signed RODRICK MEYERS RN on 01/03/2024 at 2:41 PM      Wound Care Center Information: Should you experience any significant changes in your wound(s) or have questions about your wound care, please contact the [facility] at MONDAY - FRIDAY 8:00 am - 4:30.  If you need help with your wound outside these hours and cannot wait until we are again available, contact your PCP or go to the hospital emergency room.      PLEASE NOTE: IF YOU ARE UNABLE TO OBTAIN WOUND SUPPLIES, CONTINUE TO USE THE SUPPLIES YOU HAVE AVAILABLE UNTIL YOU ARE ABLE TO REACH US. IT IS MOST IMPORTANT TO KEEP THE WOUND COVERED AT ALL TIMES.     Physician Signature:_______________________     Date: ___________ Time:  ____________

## 2024-01-03 NOTE — FLOWSHEET NOTE
01/03/24 1408   Anesthetic   Anesthetic 4% Lidocaine Cream   RLE Neurovascular Assessment   Capillary Refill Less than/Equal to 3 seconds   Color Appropriate for Ethnicity   Temperature Warm   RLE Sensation  Full sensation   R Pedal Pulse +2   LLE Neurovascular Assessment   Capillary Refill Less than/Equal to 3 seconds   Color Appropriate for Ethnicity   Temperature Warm   L Pedal Pulse +2   Wound 11/29/23 Ankle Right;Anterior #1   Date First Assessed/Time First Assessed: 11/29/23 1426   Present on Original Admission: Yes  Wound Approximate Age at First Assessment (Weeks): 40 weeks  Location: Ankle  Wound Location Orientation: Right;Anterior  Wound Description (Comments): #1   Wound Image    Dressing Status Old drainage noted   Wound Cleansed Cleansed with saline   Offloading for Diabetic Foot Ulcers Offloading not ordered   Post-Procedure Length (cm) 0.5 cm   Post-Procedure Width (cm) 1.1 cm   Post-Procedure Depth (cm) 0.2 cm   Post-Procedure Surface Area (cm^2) 0.55 cm^2   Post-Procedure Volume (cm^3) 0.11 cm^3   Wound Assessment Pink/red   Drainage Amount Scant (moist but unmeasurable)   Drainage Description Serous   Odor None   Mirian-wound Assessment Blanchable erythema   Margins Defined edges   Wound Thickness Description not for Pressure Injury Partial thickness   Wound 11/29/23 Foot Right;Anterior   Date First Assessed/Time First Assessed: 11/29/23 1427   Present on Original Admission: Yes  Wound Approximate Age at First Assessment (Weeks): 40 weeks  Location: Foot  Wound Location Orientation: Right;Anterior   Wound Image    Dressing Status Old drainage noted   Wound Cleansed Cleansed with saline   Offloading for Diabetic Foot Ulcers Offloading not ordered   Wound Length (cm) 0.4 cm   Wound Width (cm) 0.4 cm   Wound Depth (cm) 0.2 cm   Wound Surface Area (cm^2) 0.16 cm^2   Change in Wound Size % (l*w) -6.67   Wound Volume (cm^3) 0.032 cm^3   Wound Healing % -113   Wound Assessment Pink/red   Drainage Amount

## 2024-01-06 LAB
BACTERIA SPEC CULT: ABNORMAL
BACTERIA SPEC CULT: ABNORMAL
GRAM STN SPEC: ABNORMAL
GRAM STN SPEC: ABNORMAL
SERVICE CMNT-IMP: ABNORMAL

## 2024-01-08 ENCOUNTER — TELEPHONE (OUTPATIENT)
Facility: HOSPITAL | Age: 74
End: 2024-01-08

## 2024-01-08 DIAGNOSIS — L97.312 ANKLE ULCER, RIGHT, WITH FAT LAYER EXPOSED (HCC): Primary | ICD-10-CM

## 2024-01-08 DIAGNOSIS — L97.512 RIGHT FOOT ULCER, WITH FAT LAYER EXPOSED (HCC): ICD-10-CM

## 2024-01-08 RX ORDER — CIPROFLOXACIN 500 MG/1
500 TABLET, FILM COATED ORAL 2 TIMES DAILY
Qty: 20 TABLET | Refills: 0 | Status: SHIPPED | OUTPATIENT
Start: 2024-01-08 | End: 2024-01-18

## 2024-01-08 NOTE — TELEPHONE ENCOUNTER
Called patient to inform to stop taking doxycycline and to  cipro from [facility]'s pharmacy. Informed patient to take cipro twice a day for 10 days. Patient verbalized understanding and stated she would  the prescription. No other needs or concerns at this time.

## 2024-01-10 ENCOUNTER — HOSPITAL ENCOUNTER (OUTPATIENT)
Facility: HOSPITAL | Age: 74
Discharge: HOME | End: 2024-01-10
Attending: FAMILY MEDICINE
Payer: MEDICARE

## 2024-01-10 VITALS
SYSTOLIC BLOOD PRESSURE: 105 MMHG | TEMPERATURE: 98.4 F | DIASTOLIC BLOOD PRESSURE: 68 MMHG | RESPIRATION RATE: 18 BRPM | HEART RATE: 98 BPM

## 2024-01-10 DIAGNOSIS — L97.512 RIGHT FOOT ULCER, WITH FAT LAYER EXPOSED (HCC): ICD-10-CM

## 2024-01-10 DIAGNOSIS — L97.312 ANKLE ULCER, RIGHT, WITH FAT LAYER EXPOSED (HCC): Primary | ICD-10-CM

## 2024-01-10 PROCEDURE — 11042 DBRDMT SUBQ TIS 1ST 20SQCM/<: CPT

## 2024-01-10 RX ORDER — LIDOCAINE 50 MG/G
OINTMENT TOPICAL ONCE
OUTPATIENT
Start: 2024-01-10 | End: 2024-01-10

## 2024-01-10 RX ORDER — SODIUM CHLOR/HYPOCHLOROUS ACID 0.033 %
SOLUTION, IRRIGATION IRRIGATION ONCE
OUTPATIENT
Start: 2024-01-10 | End: 2024-01-10

## 2024-01-10 RX ORDER — BACITRACIN ZINC AND POLYMYXIN B SULFATE 500; 1000 [USP'U]/G; [USP'U]/G
OINTMENT TOPICAL ONCE
OUTPATIENT
Start: 2024-01-10 | End: 2024-01-10

## 2024-01-10 RX ORDER — LIDOCAINE HYDROCHLORIDE 40 MG/ML
SOLUTION TOPICAL ONCE
OUTPATIENT
Start: 2024-01-10 | End: 2024-01-10

## 2024-01-10 RX ORDER — CLOBETASOL PROPIONATE 0.5 MG/G
OINTMENT TOPICAL ONCE
OUTPATIENT
Start: 2024-01-10 | End: 2024-01-10

## 2024-01-10 RX ORDER — GINSENG 100 MG
CAPSULE ORAL ONCE
OUTPATIENT
Start: 2024-01-10 | End: 2024-01-10

## 2024-01-10 RX ORDER — LIDOCAINE HYDROCHLORIDE 20 MG/ML
JELLY TOPICAL ONCE
OUTPATIENT
Start: 2024-01-10 | End: 2024-01-10

## 2024-01-10 RX ORDER — TRIAMCINOLONE ACETONIDE 1 MG/G
OINTMENT TOPICAL ONCE
OUTPATIENT
Start: 2024-01-10 | End: 2024-01-10

## 2024-01-10 RX ORDER — LIDOCAINE 40 MG/G
CREAM TOPICAL ONCE
OUTPATIENT
Start: 2024-01-10 | End: 2024-01-10

## 2024-01-10 RX ORDER — BETAMETHASONE DIPROPIONATE 0.5 MG/G
CREAM TOPICAL ONCE
OUTPATIENT
Start: 2024-01-10 | End: 2024-01-10

## 2024-01-10 RX ORDER — GENTAMICIN SULFATE 1 MG/G
OINTMENT TOPICAL ONCE
Status: COMPLETED | OUTPATIENT
Start: 2024-01-10 | End: 2024-01-10

## 2024-01-10 RX ORDER — IBUPROFEN 200 MG
TABLET ORAL ONCE
OUTPATIENT
Start: 2024-01-10 | End: 2024-01-10

## 2024-01-10 RX ORDER — GENTAMICIN SULFATE 1 MG/G
OINTMENT TOPICAL ONCE
OUTPATIENT
Start: 2024-01-10 | End: 2024-01-10

## 2024-01-10 RX ADMIN — GENTAMICIN SULFATE: 1 OINTMENT TOPICAL at 15:21

## 2024-01-10 ASSESSMENT — PAIN SCALES - GENERAL: PAINLEVEL_OUTOF10: 0

## 2024-01-10 NOTE — FLOWSHEET NOTE
01/10/24 1553   Right Leg Edema Point of Measurement   Compression Therapy 2 layer compression wrap   Wound 11/29/23 Foot Right;Anterior #2   Date First Assessed/Time First Assessed: 11/29/23 1427   Present on Original Admission: Yes  Wound Approximate Age at First Assessment (Weeks): 40 weeks  Location: Foot  Wound Location Orientation: Right;Anterior  Wound Description (Comments): #2   Dressing/Treatment   (gentamicin, cherise, gauze, two layer compression wrap)   Offloading for Diabetic Foot Ulcers Offloading not ordered   Wound 11/29/23 Ankle Right;Anterior #1   Date First Assessed/Time First Assessed: 11/29/23 1426   Present on Original Admission: Yes  Wound Approximate Age at First Assessment (Weeks): 40 weeks  Location: Ankle  Wound Location Orientation: Right;Anterior  Wound Description (Comments): #1   Dressing Status New dressing applied   Dressing/Treatment   (gentamicin, cherise, gauze, two layer compression wrap)   Offloading for Diabetic Foot Ulcers Offloading not ordered

## 2024-01-10 NOTE — PATIENT INSTRUCTIONS
Discharge Instructions for  [facility]  [address] [address]  Phone: [phone] Fax: [phone]     NAME:  Chanelle Clay  YOB: 1950  MEDICAL RECORD NUMBER:  [id number]  DATE:  January 10, 2024    WOUND CARE ORDERS:  Right ankle wounds - Cleanse with baby shampoo. Rinse and pat dry. Apply gentamicin ointment to wound bed. Cover with cherise. Cover with dry gauze. Place 2 layer calamine compression wrap. Change weekly in clinic.     Activity:  [x] Elevate leg(s) above the level of the heart when sitting.  [x] Avoid prolonged standing in one place.   [x] Do no get dressing/wrap wet.             Dietary:  [x] Diet as tolerated      [] Diabetic Diet            [] Increase Protein: examples (Meat, cheese, eggs, greek yogurt, fish, nuts)          [] Дмитрий Therapeutic Nutrition Powder     Return Appointment:  [x] Return Appointment: With Dr. Eli Gill in 1 week.   [] Nurse Visit :   [] Ordered tests:      Electronically signed RODRICK MEYERS RN on 01/10/2024 at 2:56 PM      Wound Care Center Information: Should you experience any significant changes in your wound(s) or have questions about your wound care, please contact the [facility] at MONDAY - FRIDAY 8:00 am - 4:30.  If you need help with your wound outside these hours and cannot wait until we are again available, contact your PCP or go to the hospital emergency room.      PLEASE NOTE: IF YOU ARE UNABLE TO OBTAIN WOUND SUPPLIES, CONTINUE TO USE THE SUPPLIES YOU HAVE AVAILABLE UNTIL YOU ARE ABLE TO REACH US. IT IS MOST IMPORTANT TO KEEP THE WOUND COVERED AT ALL TIMES.     Physician Signature:_______________________     Date: ___________ Time:  ____________

## 2024-01-10 NOTE — FLOWSHEET NOTE
01/10/24 1422   Right Leg Edema Point of Measurement   Leg circumference 36 cm   Ankle circumference 20 cm   RLE Neurovascular Assessment   Capillary Refill Less than/Equal to 3 seconds   Color Appropriate for Ethnicity   Temperature Warm   RLE Sensation  Full sensation   R Pedal Pulse +2   Wound 11/29/23 Foot Right;Anterior #2   Date First Assessed/Time First Assessed: 11/29/23 1427   Present on Original Admission: Yes  Wound Approximate Age at First Assessment (Weeks): 40 weeks  Location: Foot  Wound Location Orientation: Right;Anterior  Wound Description (Comments): #2   Wound Image    Wound Length (cm) 0.5 cm   Wound Width (cm) 0.3 cm   Wound Depth (cm) 0.3 cm   Wound Surface Area (cm^2) 0.15 cm^2   Change in Wound Size % (l*w) 0   Wound Volume (cm^3) 0.045 cm^3   Wound Healing % -200   Wound Assessment [place]/red;Slough   Drainage Amount None (dry)   Drainage Description Serous   Odor None   Mirian-wound Assessment Blanchable erythema   Margins Defined edges   Wound Thickness Description not for Pressure Injury Full thickness   Wound 11/29/23 Ankle Right;Anterior #1   Date First Assessed/Time First Assessed: 11/29/23 1426   Present on Original Admission: Yes  Wound Approximate Age at First Assessment (Weeks): 40 weeks  Location: Ankle  Wound Location Orientation: Right;Anterior  Wound Description (Comments): #1   Wound Image    Wound Length (cm) 0.4 cm   Wound Width (cm) 0.3 cm   Wound Depth (cm) 0.2 cm   Wound Surface Area (cm^2) 0.12 cm^2   Change in Wound Size % (l*w) 80   Wound Volume (cm^3) 0.024 cm^3   Wound Healing % 60   Wound Assessment Pink/red   Drainage Amount Scant (moist but unmeasurable)   Drainage Description Serous   Odor None   Mirian-wound Assessment Blanchable erythema   Margins Flat/open edges   Wound Thickness Description not for Pressure Injury Partial thickness     /68   Pulse 98   Temp 98.4 °F (36.9 °C) (Temporal)   Resp 18

## 2024-01-10 NOTE — PROGRESS NOTES
(36.9 °C)     General: NAD. Hygiene good  Psych: cooperative. No anxiety or depression. Normal mood and affect.  Neuro: alert and oriented to person/place/situation. Otherwise nonfocal.  Derm: Normal  turgor for age, dry skin  HEENT: Normocephalic, atraumatic. EOMI. Conjunctiva clear. No scleral icterus.  Neck: Normal range of motion. No masses.  Chest: Respirations nonlabored  Lower extremities: color normal; temperature normal. Hair growth is not present. Calves are supple, nontender, approximately equally sized in comparison. Capillary refill <3 sec  Focussed Lower Extremity Exam:  Vascular exam:  RIGHT lower extremity: mild  edema, foot warm,   DP pulse : 2+  PT pulse: 1+  LEFT lower extremity: absent  edema, foot warm,   DP pulse : 2+  PT pulse: 1+       Ulcer Description:   See Flowsheet           Data Review:     04/19/2023   · The right resting POLLY is normal.  · The left resting POLLY is mildly decreased.  · Right toe PPG is normal. Left toe PPG is normal.  · Normal PVR waveforms bilaterally.    POLLY  The right resting POLLY is normal. The left resting POLLY is mildly decreased. PVR waveforms in the right lower thigh are normal. PVR waveforms in the right calf are normal. PVR waveforms in the right ankle are normal. PVR waveforms at the right metatarsal region are normal. PVR waveforms in the left lower thigh are normal. PVR waveforms in the left calf are normal. PVR waveforms in the left ankle are normal. PVR waveforms of the left metatarsal region are normal. Right toe PPG is normal. Left toe PPG is normal.           Procedure:     Ulcer assessment: Due to presence of necrotic tissue within the wound bed, ulcer requires debridement.    Procedure: Debridement:   The indication for debridement was reviewed with patient. Risks of procedure (bleeding, infection, pain) were discussed with patient/ and consent signed on first visit. Questions were answered      Subcutaneous excisional debridement R ankle ulcer

## 2024-01-17 ENCOUNTER — HOSPITAL ENCOUNTER (OUTPATIENT)
Facility: HOSPITAL | Age: 74
Discharge: HOME | End: 2024-01-17
Attending: FAMILY MEDICINE
Payer: MEDICARE

## 2024-01-17 VITALS
TEMPERATURE: 97.9 F | SYSTOLIC BLOOD PRESSURE: 91 MMHG | HEART RATE: 98 BPM | RESPIRATION RATE: 20 BRPM | DIASTOLIC BLOOD PRESSURE: 66 MMHG

## 2024-01-17 DIAGNOSIS — L97.312 ANKLE ULCER, RIGHT, WITH FAT LAYER EXPOSED (HCC): Primary | ICD-10-CM

## 2024-01-17 DIAGNOSIS — L97.512 RIGHT FOOT ULCER, WITH FAT LAYER EXPOSED (HCC): ICD-10-CM

## 2024-01-17 PROCEDURE — 11042 DBRDMT SUBQ TIS 1ST 20SQCM/<: CPT

## 2024-01-17 RX ORDER — BETAMETHASONE DIPROPIONATE 0.5 MG/G
CREAM TOPICAL ONCE
OUTPATIENT
Start: 2024-01-17 | End: 2024-01-17

## 2024-01-17 RX ORDER — LIDOCAINE HYDROCHLORIDE 20 MG/ML
JELLY TOPICAL ONCE
OUTPATIENT
Start: 2024-01-17 | End: 2024-01-17

## 2024-01-17 RX ORDER — GINSENG 100 MG
CAPSULE ORAL ONCE
OUTPATIENT
Start: 2024-01-17 | End: 2024-01-17

## 2024-01-17 RX ORDER — BACITRACIN ZINC AND POLYMYXIN B SULFATE 500; 1000 [USP'U]/G; [USP'U]/G
OINTMENT TOPICAL ONCE
OUTPATIENT
Start: 2024-01-17 | End: 2024-01-17

## 2024-01-17 RX ORDER — LIDOCAINE HYDROCHLORIDE 40 MG/ML
SOLUTION TOPICAL ONCE
OUTPATIENT
Start: 2024-01-17 | End: 2024-01-17

## 2024-01-17 RX ORDER — CLOBETASOL PROPIONATE 0.5 MG/G
OINTMENT TOPICAL ONCE
OUTPATIENT
Start: 2024-01-17 | End: 2024-01-17

## 2024-01-17 RX ORDER — IBUPROFEN 200 MG
TABLET ORAL ONCE
OUTPATIENT
Start: 2024-01-17 | End: 2024-01-17

## 2024-01-17 RX ORDER — SODIUM CHLOR/HYPOCHLOROUS ACID 0.033 %
SOLUTION, IRRIGATION IRRIGATION ONCE
OUTPATIENT
Start: 2024-01-17 | End: 2024-01-17

## 2024-01-17 RX ORDER — GENTAMICIN SULFATE 1 MG/G
OINTMENT TOPICAL ONCE
OUTPATIENT
Start: 2024-01-17 | End: 2024-01-17

## 2024-01-17 RX ORDER — LIDOCAINE 40 MG/G
CREAM TOPICAL ONCE
OUTPATIENT
Start: 2024-01-17 | End: 2024-01-17

## 2024-01-17 RX ORDER — LIDOCAINE 50 MG/G
OINTMENT TOPICAL ONCE
OUTPATIENT
Start: 2024-01-17 | End: 2024-01-17

## 2024-01-17 RX ORDER — TRIAMCINOLONE ACETONIDE 1 MG/G
OINTMENT TOPICAL ONCE
OUTPATIENT
Start: 2024-01-17 | End: 2024-01-17

## 2024-01-17 NOTE — FLOWSHEET NOTE
01/17/24 1627   Right Leg Edema Point of Measurement   Compression Therapy 2 layer compression wrap   Wound 11/29/23 Foot Right;Anterior #2   Date First Assessed/Time First Assessed: 11/29/23 1427   Present on Original Admission: Yes  Wound Approximate Age at First Assessment (Weeks): 40 weeks  Location: Foot  Wound Location Orientation: Right;Anterior  Wound Description (Comments): #2   Dressing/Treatment   (santyl, cherise, gauze, two layer calamine compression wrap)   Offloading for Diabetic Foot Ulcers Offloading not ordered

## 2024-01-17 NOTE — FLOWSHEET NOTE
01/17/24 1509   Anesthetic   Anesthetic 4% Lidocaine Cream   Wound 11/29/23 Ankle Right;Anterior #1   Date First Assessed/Time First Assessed: 11/29/23 1426   Present on Original Admission: Yes  Wound Approximate Age at First Assessment (Weeks): 40 weeks  Location: Ankle  Wound Location Orientation: Right;Anterior  Wound Description (Comments): #1   Wound Image    Dressing Status Intact   Wound Length (cm) 0.1 cm   Wound Width (cm) 0.1 cm   Wound Depth (cm) 0.1 cm   Wound Surface Area (cm^2) 0.01 cm^2   Change in Wound Size % (l*w) 98.33   Wound Volume (cm^3) 0.001 cm^3   Wound Healing % 98   Wound Assessment Epithelialization   Drainage Amount None (dry)   Odor None   Mirian-wound Assessment Intact   Margins Attached edges   Wound Thickness Description not for Pressure Injury Partial thickness   Wound 11/29/23 Foot Right;Anterior #2   Date First Assessed/Time First Assessed: 11/29/23 1427   Present on Original Admission: Yes  Wound Approximate Age at First Assessment (Weeks): 40 weeks  Location: Foot  Wound Location Orientation: Right;Anterior  Wound Description (Comments): #2   Wound Image    Wound Length (cm) 0.1 cm   Wound Width (cm) 0.1 cm   Wound Depth (cm) 0.1 cm   Wound Surface Area (cm^2) 0.01 cm^2   Change in Wound Size % (l*w) 93.33   Wound Volume (cm^3) 0.001 cm^3   Wound Healing % 93   Wound Assessment Epithelialization   Drainage Amount None (dry)   Odor None   Margins Attached edges   Wound Thickness Description not for Pressure Injury Partial thickness     BP 91/66   Pulse 98   Temp 97.9 °F (36.6 °C) (Tympanic)   Resp 20

## 2024-01-17 NOTE — PATIENT INSTRUCTIONS
Discharge Instructions for  [facility]  [address] [address]  Phone: [phone] Fax: [phone]     NAME:  Chanelle Clay  YOB: 1950  MEDICAL RECORD NUMBER:  [id number]  DATE:  January 17, 2024    WOUND CARE ORDERS:  Right ankle wound - Cleanse with baby shampoo. Rinse and pat dry. Apply gentamicin ointment to wound bed. Cover with cherise. Cover with dry gauze. Place 2 layer calamine compression wrap. Change weekly in clinic.     Activity:  [x] Elevate leg(s) above the level of the heart when sitting.  [x] Avoid prolonged standing in one place.   [x] Do no get dressing/wrap wet.             Dietary:  [x] Diet as tolerated      [] Diabetic Diet            [] Increase Protein: examples (Meat, cheese, eggs, greek yogurt, fish, nuts)          [] Дмитрий Therapeutic Nutrition Powder     Return Appointment:  [x] Return Appointment: With Dr. Eli Gill in 1 week.   [] Nurse Visit :   [] Ordered tests:      Electronically signed RODRICK MEYERS RN on 01/17/2024 at 3:33 PM      Wound Care Center Information: Should you experience any significant changes in your wound(s) or have questions about your wound care, please contact the [facility] at MONDAY - FRIDAY 8:00 am - 4:30.  If you need help with your wound outside these hours and cannot wait until we are again available, contact your PCP or go to the hospital emergency room.      PLEASE NOTE: IF YOU ARE UNABLE TO OBTAIN WOUND SUPPLIES, CONTINUE TO USE THE SUPPLIES YOU HAVE AVAILABLE UNTIL YOU ARE ABLE TO REACH US. IT IS MOST IMPORTANT TO KEEP THE WOUND COVERED AT ALL TIMES.     Physician Signature:_______________________     Date: ___________ Time:  ____________

## 2024-01-17 NOTE — PROGRESS NOTES
Wound Center  Progress Note / Procedure Note      Chief Complaint:  Chanelle Clay is a 73 y.o. female  with Right foot and ankle wound since February 2023      Assessment/Plan     73 y.o. female with     -R ankle anterior chronic ulcer.  Full thickness  healed    -R foot anterior chronic ulcer.  Full thickness  Thinning necrotic layer  All necrotic  Necessitates debridement  for wound healing and to prevent/heal infection  Ulcer needs debridement- see note below    Culture reviewed   Doxy d/c'd, started on cipro  Patient did not take concern for kidney- has only one kidney  Reviewed GFR>60  Still hesitant.   Since light growth-   Debridement will suffice.  To defer abx for now    Leg swelling  Elevate  compression    -Nicotine dependence  Effects of nicotine on wound healing discussed      -Vascular  Normal ABIs/TBIs/ in April      Following discussed with patient / sister  Needs :  Serial debridement- prn    Good local wound care  Dressing: gentamicin, collagen  Frequency : once weekly        Patient/  understood and agrees with plan. Questions answered.    Follow up with me in 1 week    Subjective:   Since last visit  Concern re cipro- did not start      HPI:     Wound since Feb   Acquired from having laser therapy on feet for pain  Dr Mancini's office did not think it was due to laser  Being treated there since then  Not healing so came here  Has tried collagen, iodine and some other products    Has been on 2 course of abx, last one 4 weeks ago    History/Chart/Medications reviewed    Current wound care:See flowsheet  Offloading wound: Yes  Appetite: good  Wound associated pain: See flowsheet  Diabetic: no  Smoker: yes  ROS: no N/V/D, no T/chills; no local rash, no chest pain or shortness of breath, no headache or dizzyness      Objective:     Physical Exam:   See flowsheet / nursing notes for vitals  Vitals:    01/17/24 1506   BP: 91/66   Pulse: 98   Resp: 20   Temp: 97.9 °F (36.6 °C)     General: NAD.

## 2024-01-24 ENCOUNTER — HOSPITAL ENCOUNTER (OUTPATIENT)
Facility: HOSPITAL | Age: 74
Discharge: HOME | End: 2024-01-24
Attending: FAMILY MEDICINE
Payer: MEDICARE

## 2024-01-24 VITALS — DIASTOLIC BLOOD PRESSURE: 70 MMHG | RESPIRATION RATE: 20 BRPM | TEMPERATURE: 98.1 F | SYSTOLIC BLOOD PRESSURE: 116 MMHG

## 2024-01-24 DIAGNOSIS — L97.312 ANKLE ULCER, RIGHT, WITH FAT LAYER EXPOSED (HCC): Primary | ICD-10-CM

## 2024-01-24 DIAGNOSIS — L97.512 RIGHT FOOT ULCER, WITH FAT LAYER EXPOSED (HCC): ICD-10-CM

## 2024-01-24 PROCEDURE — 29581 APPL MULTLAYER CMPRN SYS LEG: CPT

## 2024-01-24 RX ORDER — IBUPROFEN 200 MG
TABLET ORAL ONCE
OUTPATIENT
Start: 2024-01-24 | End: 2024-01-24

## 2024-01-24 RX ORDER — GENTAMICIN SULFATE 1 MG/G
OINTMENT TOPICAL ONCE
OUTPATIENT
Start: 2024-01-24 | End: 2024-01-24

## 2024-01-24 RX ORDER — GINSENG 100 MG
CAPSULE ORAL ONCE
OUTPATIENT
Start: 2024-01-24 | End: 2024-01-24

## 2024-01-24 RX ORDER — LIDOCAINE 50 MG/G
OINTMENT TOPICAL ONCE
OUTPATIENT
Start: 2024-01-24 | End: 2024-01-24

## 2024-01-24 RX ORDER — BETAMETHASONE DIPROPIONATE 0.5 MG/G
CREAM TOPICAL ONCE
OUTPATIENT
Start: 2024-01-24 | End: 2024-01-24

## 2024-01-24 RX ORDER — TRIAMCINOLONE ACETONIDE 1 MG/G
OINTMENT TOPICAL ONCE
OUTPATIENT
Start: 2024-01-24 | End: 2024-01-24

## 2024-01-24 RX ORDER — BACITRACIN ZINC AND POLYMYXIN B SULFATE 500; 1000 [USP'U]/G; [USP'U]/G
OINTMENT TOPICAL ONCE
OUTPATIENT
Start: 2024-01-24 | End: 2024-01-24

## 2024-01-24 RX ORDER — SODIUM CHLOR/HYPOCHLOROUS ACID 0.033 %
SOLUTION, IRRIGATION IRRIGATION ONCE
OUTPATIENT
Start: 2024-01-24 | End: 2024-01-24

## 2024-01-24 RX ORDER — LIDOCAINE HYDROCHLORIDE 20 MG/ML
JELLY TOPICAL ONCE
OUTPATIENT
Start: 2024-01-24 | End: 2024-01-24

## 2024-01-24 RX ORDER — CLOBETASOL PROPIONATE 0.5 MG/G
OINTMENT TOPICAL ONCE
OUTPATIENT
Start: 2024-01-24 | End: 2024-01-24

## 2024-01-24 RX ORDER — LIDOCAINE HYDROCHLORIDE 40 MG/ML
SOLUTION TOPICAL ONCE
OUTPATIENT
Start: 2024-01-24 | End: 2024-01-24

## 2024-01-24 RX ORDER — LIDOCAINE 40 MG/G
CREAM TOPICAL ONCE
OUTPATIENT
Start: 2024-01-24 | End: 2024-01-24

## 2024-01-24 ASSESSMENT — PAIN SCALES - GENERAL: PAINLEVEL_OUTOF10: 0

## 2024-01-24 NOTE — PROGRESS NOTES
Wound Center  Progress Note / Procedure Note      Chief Complaint:  Chanelle Clay is a 73 y.o. female  with Right foot and ankle wound since February 2023      Assessment/Plan     73 y.o. female with     -R ankle anterior chronic ulcer.  Full thickness  healed    -R foot anterior chronic ulcer.  Full thickness  Healed, but frafile      Leg swelling  Elevate  compression    -Nicotine dependence  Effects of nicotine on wound healing- aware      -Vascular  Normal ABIs/TBIs/ in April      Following discussed with patient / sister  Needs :  Serial debridement- prn    Good local wound care  Dressing: zinc to both areas; D/C previous  Frequency : once weekly    unn[place]    Patient/  understood and agrees with plan. Questions answered.    Follow up with me in 1 week    Subjective:   Since last visit  Tolerated wrap, no issues      HPI:     Wound since Feb   Acquired from having laser therapy on feet for pain  Dr Mancini's office did not think it was due to laser  Being treated there since then  Not healing so came here  Has tried collagen, iodine and some other products    Has been on 2 course of abx, last one 4 weeks ago    History/Chart/Medications reviewed    Current wound care:See flowsheet  Offloading wound: Yes  Appetite: good  Wound associated pain: See flowsheet  Diabetic: no  Smoker: yes  ROS: no N/V/D, no T/chills; no local rash, no chest pain or shortness of breath, no headache or dizzyness      Objective:     Physical Exam:   See flowsheet / nursing notes for vitals  Vitals:    01/24/24 1522   BP: 116/70   Resp: 20   Temp: 98.1 °F (36.7 °C)     General: NAD. Hygiene good  Psych: cooperative. No anxiety or depression. Normal mood and affect.  Neuro: alert and oriented to person/place/situation. Otherwise nonfocal.  Derm: Normal  turgor for age, dry skin  HEENT: Normocephalic, atraumatic. EOMI. Conjunctiva clear. No scleral icterus.  Neck: Normal range of motion. No masses.  Chest: Respirations nonlabored  Lower

## 2024-01-24 NOTE — PATIENT INSTRUCTIONS
Discharge Instructions for  [facility]  [address] [address]  Phone: [phone] Fax: [phone]     NAME:  Chanelle Clay  YOB: 1950  MEDICAL RECORD NUMBER:  [id number]  DATE:  January 24, 2024    WOUND CARE ORDERS:  Right ankle wound - Cleanse with baby shampoo. Rinse and pat dry. Apply Zinc oxide to wound. Place 2 layer calamine compression wrap. Change weekly in clinic.     Activity:  [x] Elevate leg(s) above the level of the heart when sitting.  [x] Avoid prolonged standing in one place.   [x] Do no get dressing/wrap wet.             Dietary:  [x] Diet as tolerated      [] Diabetic Diet            [] Increase Protein: examples (Meat, cheese, eggs, greek yogurt, fish, nuts)          [] Дмитрий Therapeutic Nutrition Powder     Return Appointment:  [x] Return Appointment: With Dr. Eli Gill in 1 week.   [] Nurse Visit :   [] Ordered tests:      Electronically signed RODRICK MEYERS RN on 01/24/2024 at 4:02 PM      Wound Care Center Information: Should you experience any significant changes in your wound(s) or have questions about your wound care, please contact the [facility] at MONDAY - FRIDAY 8:00 am - 4:30.  If you need help with your wound outside these hours and cannot wait until we are again available, contact your PCP or go to the hospital emergency room.      PLEASE NOTE: IF YOU ARE UNABLE TO OBTAIN WOUND SUPPLIES, CONTINUE TO USE THE SUPPLIES YOU HAVE AVAILABLE UNTIL YOU ARE ABLE TO REACH US. IT IS MOST IMPORTANT TO KEEP THE WOUND COVERED AT ALL TIMES.     Physician Signature:_______________________     Date: ___________ Time:  ____________            Infectious Diseases Daily Progress Note      Gurwinder Montenegro  Date of Service: 12/1/2021   Hospital Day: 5  Principal Diagnosis:                            This is  Gurwinder Montenegro who is a 28 year old  male admitted 11/27/2021  7:05 PM     1. Sepsis. Multiple possible sources. UTI. Sacral wound.   2. Abnormal UA. Possible UTI.   3. Sacral wound worsened. Recently treated as osteomyelitis.   4. Developmental delay.   5. History of HTN, dyslipidemia. LINDSEY    Current antimicrobials:                             Vancomycin   Meropenem    Scheduled Medications acetic acid, , BID  meropenem (MERREM) extended infusion IVPB, 1 g, 3 times per day  vancomycin (VANCOCIN) IVPB, 1,000 mg, 3 times per day  acetic acid, , Daily  cholecalciferol, 25 mcg, Daily  cloNIDine, 0.1 mg, 3 times per day  docusate sodium-sennosides, 2 tablet, Nightly  lisinopril-hydroCHLOROthiazide 10-12.5 mg, , Daily  risperiDONE, 0.5 mg, Nightly  enoxaparin, 40 mg, Daily  Potassium Standard Replacement Protocol, , See Admin Instructions  Phosphorus Standard Replacement Protocol, , See Admin Instructions  Magnesium Standard Replacement Protocol, , See Admin Instructions  phenyTOIN, 300 mg, QHS  phenyTOIN, 100 mg, Daily  VANCOMYCIN - PHARMACIST MONITORED, , See Admin Instructions  sodium chloride (PF), 2 mL, 2 times per day        PMHx, Social Hx , Medications and Allergies reviewed.    Reviewed Pertinent: Laboratory studies, radiographic studies, medications, and recent progress notes.    Subjective:  No new issues today.     ROS: No fever, chills, no nausea or abd pain, diarrhea  , No rashes     Objective:     Vital Signs:   Visit Vitals  /66 (BP Location: LUE - Left upper extremity, Patient Position: Semi-De La Paz's)   Pulse 100   Temp 98.2 °F (36.8 °C) (Oral)   Resp 20   Ht 5' 9\" (1.753 m)   Wt 110.2 kg   SpO2 99%   BMI 35.88 kg/m²      Temp  Min: 98.2 °F (36.8 °C)  Max: 99.5 °F (37.5 °C)     Physical Exam:       Constitutional:       General: He is not in  acute distress.      Appearance: He is well-developed. He is not ill-appearing, toxic-appearing or diaphoretic.      Comments: Developmentally delayed.    HENT:      Head: Normocephalic and atraumatic.      Mouth/Throat:      Comments: Masked for pt protection, voice normal  Eyes:    normal. .   Cardiovascular:   Tachycardic. RRR no murmurs.   Pulmonary:     CTAB  Abdominal:   Soft, non tender.   Genitourinary:     Comments: Suprapubic catheter and with orange tint urine  Musculoskeletal:         General: Swelling present.      Cervical back: Normal range of motion and neck supple.   Skin:      Wound on his left heel that is healing no cellulitis 2 + DP pulses  Large decubitus ulcer with foul smelling drainage.  pictured below.                     Neurological:     baseline. Awake.    Psychiatric:         Mood and Affect: Mood normal.                 Labs Reviewed    Reviewed     Recent Labs   Lab 11/29/21 1752 11/29/21 0911 11/27/21 2119 11/27/21 2003   SODIUM  --   --  132*  --    POTASSIUM 3.4 3.0* 3.5  --    CHLORIDE  --   --  96*  --    CO2  --   --  29  --    ANIONGAP  --   --  11  --    BUN  --   --  5*  --    CREATININE  --   --  0.42* 0.40*   GLUCOSE  --   --  106*  --    CALCIUM  --   --  8.3*  --    ALBUMIN  --   --  1.7*  --    AST  --   --  30  --    GPT  --   --  20  --    ALKPT  --   --  198*  --    BILIRUBIN  --   --  0.5  --    LIPA  --   --  88  --    RESR  --   --  46*  --    CRP  --   --  28.0*  --     Recent Labs   Lab 11/29/21 0911 11/27/21 2119   WBC 12.5* 15.6*   HGB 8.8* 8.9*   HCT 27.9* 27.3*    284   CRP  --  28.0*   )       Lab Results   Component Value Date    VANCR 13.9 10/09/2021    VANCT 17.4 10/13/2021     Recent Labs     08/31/21  1330 09/14/21  0608 09/15/21  0515 10/09/21  0653 11/27/21 2119   RESR   < >  --  38* 84* 46*   CRP  --  3.7*  --  5.7* 28.0*    < > = values in this interval not displayed.       URINALYSIS  Recent Labs   Lab 11/27/21 2010   USPG 1.020    UPH 7.0   UKET Negative   UPROT 100 *   UGLU Negative   UBILI Negative   UROB 4.0*   UWBC Moderate*   UNITR Negative   URBC Negative            Imaging:     CXR: 1.  Interval PICC line removal.  2.  Otherwise stable.     CTA/p: 1.  Diffuse significant bladder wall thickening, similar to priors and  likely related to chronic indwelling suprapubic catheter.  2.  Mild pelvic free fluid, slightly increased from prior and possibly  reactive.  3.  Few foci of air within the subcutaneous tissues overlying the right  greater trochanter, improved from prior and likely related to healing  decubitus ulcer better seen on prior CT dated 10/1/2021     MICROBIOLOGY:   Results for NITO HO (MRN [id number]) as of 11/28/2021 08:51    Ref. Range 11/27/2021 20:04   Lactic Acid Venous Latest Ref Range: <2.0 mmol/L 1.9   Results for NITO HO (MRN [id number]) as of 11/28/2021 08:51    Ref. Range 10/9/2021 06:53 11/27/2021 20:03 11/27/2021 21:19   ESR Latest Ref Range: 0 - 20 mm/hr 84 (H)   46 (H)   Results for NITO HO (MRN [id number]) as of 11/28/2021 08:51    Ref. Range 10/9/2021 06:53 11/27/2021 21:19   C-REACTIVE PROTEIN Latest Ref Range: <=1.0 mg/dL 5.7 (H) 28.0 (H)   Results for NITO HO (MRN [id number]) as of 11/28/2021 08:51    Ref. Range 11/27/2021 19:20   SARS CoV 2 Qualitative RT PCR Latest Ref Range: Not Detected / Detected / Presumptive Positive / Inhibitors present  Not Detected      Urine culture:   60,000 ,000 CFU/mL Pseudomonas aeruginosa Abnormal        60,000 ,000 CFU/mL Providencia stuartii        pending UA very positive.   BC: negative X 2.   Swab 11/27: pending.    Rare Staphylococcus aureus, methicillin resistant Abnormal       Assessment:     28 year old with PMH as above, here with      1. Sepsis. Multiple possible sources. UTI. Sacral wound.   2. Abnormal UA. Possible UTI.   3. Sacral wound worsened. Recently treated as osteomyelitis.   4. Developmental delay.   5. History of HTN, dyslipidemia.  LINDSEY     Plan:     1. Continue meropenem and vancomycin empirically  2. Follow urine culture, as above, pseudomonas, serratia R to quinolones.   BC, negative.   swab of wound. MRSA  3. No  diarrhea.   5. Wound care on board.   6. Will talk to POA to get SARS CoV2 vaccine hopefully.      To have debridement of all his wounds today . I would appreciate tissue cultures and pathology if considered necessary.     Got SP catheter exchanged.     No other recommendations for now, will follow            Arslan Landrum M.D.  Infectious Diseases  Pager: [phone]  12/1/2021  11:14 AM

## 2024-01-24 NOTE — FLOWSHEET NOTE
01/24/24 1522   Right Leg Edema Point of Measurement   Leg circumference 35 cm   Ankle circumference 19 cm   Compression Therapy 2 layer compression wrap   RLE Neurovascular Assessment   Capillary Refill Less than/Equal to 3 seconds   Color Appropriate for Ethnicity   Temperature Warm   RLE Sensation  Full sensation   R Pedal Pulse +2   Wound 11/29/23 Foot Right;Anterior #2   Date First Assessed/Time First Assessed: 11/29/23 1427   Present on Original Admission: Yes  Wound Approximate Age at First Assessment (Weeks): 40 weeks  Location: Foot  Wound Location Orientation: Right;Anterior  Wound Description (Comments): #2   Wound Length (cm) 0.3 cm   Wound Width (cm) 0.2 cm   Wound Depth (cm) 0.1 cm   Wound Surface Area (cm^2) 0.06 cm^2   Change in Wound Size % (l*w) 60   Wound Volume (cm^3) 0.006 cm^3   Wound Healing % 60   Wound Assessment Epithelialization   Drainage Amount None (dry)   Odor None   Mirian-wound Assessment Intact   Margins Defined edges   Wound Thickness Description not for Pressure Injury Partial thickness   Pain Assessment   Pain Assessment None - Denies Pain   Pain Level 0   Patient's Stated Pain Goal 0 - No pain     /70   Temp 98.1 °F (36.7 °C) (Temporal)   Resp 20

## 2024-01-24 NOTE — FLOWSHEET NOTE
Multilayer Compression Wrap   (Not Unna) Below the Knee    NAME:  Chanelle Clay  YOB: 1950  MEDICAL RECORD NUMBER:  [id number]  DATE:  1/24/2024    Multilayer compression wrap: Removed old Multilayer wrap if indicated and wash leg with mild soap/water.  Applied moisturizing agent to dry skin as needed.   Applied primary and secondary dressing as ordered.  Applied multilayered dressing below the knee to right lower leg.  Instructed patient/caregiver not to remove dressing and to keep it clean and dry.   Instructed patient/caregiver on complications to report to provider, such as pain, numbness in toes, heavy drainage, and slippage of dressing.  Instructed patient on purpose of compression dressing and on activity and exercise recommendations.    Discharge Condition: Stable    Pain: 0    Ambulatory Status: None    Discharge Destination: home    Transportation:car    Accompanied by: SELF    Discharge instructions reviewed with SELF and copy or written instructions have been provided. All questions/concerns have been addressed at this time.         Electronically signed by Carola Horn RN on 1/24/2024 at 4:24 PM

## 2024-01-31 ENCOUNTER — HOSPITAL ENCOUNTER (OUTPATIENT)
Facility: HOSPITAL | Age: 74
Discharge: HOME | End: 2024-01-31
Attending: FAMILY MEDICINE
Payer: MEDICARE

## 2024-01-31 VITALS
SYSTOLIC BLOOD PRESSURE: 105 MMHG | HEART RATE: 87 BPM | DIASTOLIC BLOOD PRESSURE: 58 MMHG | RESPIRATION RATE: 20 BRPM | TEMPERATURE: 97 F

## 2024-01-31 DIAGNOSIS — L97.512 RIGHT FOOT ULCER, WITH FAT LAYER EXPOSED (HCC): ICD-10-CM

## 2024-01-31 DIAGNOSIS — L97.312 ANKLE ULCER, RIGHT, WITH FAT LAYER EXPOSED (HCC): Primary | ICD-10-CM

## 2024-01-31 PROCEDURE — 11042 DBRDMT SUBQ TIS 1ST 20SQCM/<: CPT

## 2024-01-31 PROCEDURE — 11043 DBRDMT MUSC&/FSCA 1ST 20/<: CPT

## 2024-01-31 RX ORDER — LIDOCAINE HYDROCHLORIDE 20 MG/ML
JELLY TOPICAL ONCE
OUTPATIENT
Start: 2024-01-31 | End: 2024-01-31

## 2024-01-31 RX ORDER — LIDOCAINE HYDROCHLORIDE 40 MG/ML
SOLUTION TOPICAL ONCE
OUTPATIENT
Start: 2024-01-31 | End: 2024-01-31

## 2024-01-31 RX ORDER — BACITRACIN ZINC AND POLYMYXIN B SULFATE 500; 1000 [USP'U]/G; [USP'U]/G
OINTMENT TOPICAL ONCE
OUTPATIENT
Start: 2024-01-31 | End: 2024-01-31

## 2024-01-31 RX ORDER — GENTAMICIN SULFATE 1 MG/G
OINTMENT TOPICAL ONCE
Status: COMPLETED | OUTPATIENT
Start: 2024-01-31 | End: 2024-01-31

## 2024-01-31 RX ORDER — CLOBETASOL PROPIONATE 0.5 MG/G
OINTMENT TOPICAL ONCE
OUTPATIENT
Start: 2024-01-31 | End: 2024-01-31

## 2024-01-31 RX ORDER — IBUPROFEN 200 MG
TABLET ORAL ONCE
OUTPATIENT
Start: 2024-01-31 | End: 2024-01-31

## 2024-01-31 RX ORDER — GENTAMICIN SULFATE 1 MG/G
OINTMENT TOPICAL ONCE
OUTPATIENT
Start: 2024-01-31 | End: 2024-01-31

## 2024-01-31 RX ORDER — LIDOCAINE 40 MG/G
CREAM TOPICAL ONCE
OUTPATIENT
Start: 2024-01-31 | End: 2024-01-31

## 2024-01-31 RX ORDER — BETAMETHASONE DIPROPIONATE 0.5 MG/G
CREAM TOPICAL ONCE
OUTPATIENT
Start: 2024-01-31 | End: 2024-01-31

## 2024-01-31 RX ORDER — GINSENG 100 MG
CAPSULE ORAL ONCE
OUTPATIENT
Start: 2024-01-31 | End: 2024-01-31

## 2024-01-31 RX ORDER — LIDOCAINE 50 MG/G
OINTMENT TOPICAL ONCE
OUTPATIENT
Start: 2024-01-31 | End: 2024-01-31

## 2024-01-31 RX ORDER — TRIAMCINOLONE ACETONIDE 1 MG/G
OINTMENT TOPICAL ONCE
OUTPATIENT
Start: 2024-01-31 | End: 2024-01-31

## 2024-01-31 RX ORDER — SODIUM CHLOR/HYPOCHLOROUS ACID 0.033 %
SOLUTION, IRRIGATION IRRIGATION ONCE
OUTPATIENT
Start: 2024-01-31 | End: 2024-01-31

## 2024-01-31 RX ADMIN — GENTAMICIN SULFATE: 1 OINTMENT TOPICAL at 13:55

## 2024-01-31 ASSESSMENT — PAIN SCALES - GENERAL: PAINLEVEL_OUTOF10: 0

## 2024-01-31 NOTE — FLOWSHEET NOTE
01/31/24 1400   Right Leg Edema Point of Measurement   Compression Therapy 2 layer compression wrap   Wound 11/29/23 Foot Right;Anterior #2   Date First Assessed/Time First Assessed: 11/29/23 1427   Present on Original Admission: Yes  Wound Approximate Age at First Assessment (Weeks): 40 weeks  Location: Foot  Wound Location Orientation: Right;Anterior  Wound Description (Comments): #2   Dressing Status New dressing applied   Wound Cleansed Soap and water   Dressing/Treatment Betadine swabs/povidone iodine;Collagen with Ag;Gauze dressing/dressing sponge  (mupiracin)     Discharge Condition: Stable    Pain: 0    Ambulatory Status: Wheelchair    Discharge Destination: home    Transportation:car    Accompanied by: SELF    Discharge instructions reviewed with FAMILY and copy or written instructions have been provided. All questions/concerns have been addressed at this time.     Multilayer Compression Wrap   (Not Unna) Below the Knee    NAME:  Chanelle Clay  YOB: 1950  MEDICAL RECORD NUMBER:  [id number]  DATE:  1/31/2024    Multilayer compression wrap: Removed old Multilayer wrap if indicated and wash leg with mild soap/water.  Applied moisturizing agent to dry skin as needed.   Applied primary and secondary dressing as ordered.  Applied multilayered dressing below the knee to right lower leg.  Instructed patient/caregiver not to remove dressing and to keep it clean and dry.   Instructed patient/caregiver on complications to report to provider, such as pain, numbness in toes, heavy drainage, and slippage of dressing.  Instructed patient on purpose of compression dressing and on activity and exercise recommendations.      Electronically signed by Carola Horn RN on 1/31/2024 at 2:05 PM

## 2024-01-31 NOTE — PROGRESS NOTES
Wound Center  Progress Note / Procedure Note      Chief Complaint:  Chanelle Clay is a 73 y.o. female  with Right foot and ankle wound since February 2023      Assessment/Plan     73 y.o. female with     -R ankle anterior chronic ulcer.  Full thickness  Remains healed- use betadine today    -R foot anterior chronic ulcer.  Full thickness  Scab came off- still open  All necrotic  Debrided thoroughly      Leg swelling  Elevate  compression    -Nicotine dependence  Effects of nicotine on wound healing- aware      -Vascular  Normal ABIs/TBIs/ in April      Following discussed with patient / sister  Needs :  Serial debridement- prn    Good local wound care  Dressing: gentamicin, collagen  Frequency : once weekly    unnas    Patient/  understood and agrees with plan. Questions answered.    Follow up with me in 1 week    Subjective:   Since last visit  no issues      HPI:     Wound since Feb   Acquired from having laser therapy on feet for pain  Dr Mancini's office did not think it was due to laser  Being treated there since then  Not healing so came here  Has tried collagen, iodine and some other products    Has been on 2 course of abx, last one 4 weeks ago    History/Chart/Medications reviewed    Current wound care:See flowsheet  Offloading wound: Yes  Appetite: good  Wound associated pain: See flowsheet  Diabetic: no  Smoker: yes  ROS: no N/V/D, no T/chills; no local rash, no chest pain or shortness of breath, no headache or dizzyness      Objective:     Physical Exam:   See flowsheet / nursing notes for vitals  Vitals:    01/31/24 1322   BP: (!) 105/58   Pulse: 87   Resp: 20   Temp: 97 °F (36.1 °C)     General: NAD. Hygiene good  Psych: cooperative. No anxiety or depression. Normal mood and affect.  Neuro: alert and oriented to person/place/situation. Otherwise nonfocal.  Derm: Normal  turgor for age, dry skin  HEENT: Normocephalic, atraumatic. EOMI. Conjunctiva clear. No scleral icterus.  Neck: Normal range of

## 2024-01-31 NOTE — FLOWSHEET NOTE
01/31/24 1322   Right Leg Edema Point of Measurement   Leg circumference 35.2 cm   Ankle circumference 19.5 cm   Compression Therapy 2 layer compression wrap   RLE Neurovascular Assessment   Capillary Refill Less than/Equal to 3 seconds   Color Appropriate for Ethnicity   Temperature Warm   RLE Sensation  Full sensation   Wound 11/29/23 Foot Right;Anterior #2   Date First Assessed/Time First Assessed: 11/29/23 1427   Present on Original Admission: Yes  Wound Approximate Age at First Assessment (Weeks): 40 weeks  Location: Foot  Wound Location Orientation: Right;Anterior  Wound Description (Comments): #2   Wound Image    Dressing Status Intact   Wound Cleansed Soap and water   Wound Length (cm) 0.5 cm   Wound Width (cm) 0.3 cm   Wound Depth (cm) 0.2 cm   Wound Surface Area (cm^2) 0.15 cm^2   Change in Wound Size % (l*w) 0   Wound Volume (cm^3) 0.03 cm^3   Wound Healing % -100   Wound Assessment Slough   Drainage Amount None (dry)   Odor None   Mirian-wound Assessment Blanchable erythema   Margins Defined edges;Epibole (rolled edges)   Wound Thickness Description not for Pressure Injury Partial thickness   [REMOVED] Wound 01/31/24 Ankle Right;Lateral #3   Final Assessment Date/Final Assessment Time: 01/31/24 1400  Date First Assessed/Time First Assessed: 01/31/24 1329   Present on Original Admission: No  Wound Approximate Age at First Assessment (Weeks): 1 weeks  Location: Ankle  Wound Location Orienta...   Wound Image    Wound Length (cm) 0 cm   Wound Width (cm) 0 cm   Wound Depth (cm) 0 cm   Wound Surface Area (cm^2) 0 cm^2   Wound Volume (cm^3) 0 cm^3   Wound Assessment Pink/red;Epithelialization   Drainage Amount None (dry)   Odor None   Mirian-wound Assessment Hemosiderin staining (brown yellow)   Margins Attached edges   Wound Thickness Description not for Pressure Injury Partial thickness   Pain Assessment   Pain Assessment None - Denies Pain   Pain Level 0   Patient's Stated Pain Goal 0 - No pain     BP (!)

## 2024-01-31 NOTE — FLOWSHEET NOTE
01/31/24 1322   Right Leg Edema Point of Measurement   Leg circumference 35.2 cm   Ankle circumference 19.5 cm   Compression Therapy 2 layer compression wrap   RLE Neurovascular Assessment   Capillary Refill Less than/Equal to 3 seconds   Color Appropriate for Ethnicity   Temperature Warm   RLE Sensation  Full sensation   Wound 11/29/23 Foot Right;Anterior #2   Date First Assessed/Time First Assessed: 11/29/23 1427   Present on Original Admission: Yes  Wound Approximate Age at First Assessment (Weeks): 40 weeks  Location: Foot  Wound Location Orientation: Right;Anterior  Wound Description (Comments): #2   Wound Image    Dressing Status Intact   Wound Cleansed Soap and water   Wound Length (cm) 0.5 cm   Wound Width (cm) 0.3 cm   Wound Depth (cm) 0.2 cm   Wound Surface Area (cm^2) 0.15 cm^2   Change in Wound Size % (l*w) 0   Wound Volume (cm^3) 0.03 cm^3   Wound Healing % -100   Wound Assessment Slough   Drainage Amount None (dry)   Odor None   Mirian-wound Assessment Blanchable erythema   Margins Defined edges;Epibole (rolled edges)   Wound Thickness Description not for Pressure Injury Partial thickness   [REMOVED] Wound 01/31/24 Ankle Right;Lateral #3   Final Assessment Date/Final Assessment Time: 01/31/24 1400  Date First Assessed/Time First Assessed: 01/31/24 1329   Present on Original Admission: No  Wound Approximate Age at First Assessment (Weeks): 1 weeks  Location: Ankle  Wound Location Orienta...   Wound Length (cm) 0 cm   Wound Width (cm) 0 cm   Wound Depth (cm) 0 cm   Wound Surface Area (cm^2) 0 cm^2   Wound Volume (cm^3) 0 cm^3   Wound Assessment Pink/red;Epithelialization   Drainage Amount None (dry)   Odor None   Pain Assessment   Pain Assessment None - Denies Pain   Pain Level 0   Patient's Stated Pain Goal 0 - No pain     BP (!) 105/58   Pulse 87   Temp 97 °F (36.1 °C) (Temporal)   Resp 20

## 2024-01-31 NOTE — FLOWSHEET NOTE
01/31/24 1322   Right Leg Edema Point of Measurement   Leg circumference 35.2 cm   Ankle circumference 19.5 cm   Compression Therapy 2 layer compression wrap   RLE Neurovascular Assessment   Capillary Refill Less than/Equal to 3 seconds   Color Appropriate for Ethnicity   Temperature Warm   RLE Sensation  Full sensation   Wound 11/29/23 Foot Right;Anterior #2   Date First Assessed/Time First Assessed: 11/29/23 1427   Present on Original Admission: Yes  Wound Approximate Age at First Assessment (Weeks): 40 weeks  Location: Foot  Wound Location Orientation: Right;Anterior  Wound Description (Comments): #2   Wound Image    Dressing Status Intact   Wound Cleansed Soap and water   Wound Length (cm) 0.5 cm   Wound Width (cm) 0.3 cm   Wound Depth (cm) 0.2 cm   Wound Surface Area (cm^2) 0.15 cm^2   Change in Wound Size % (l*w) 0   Wound Volume (cm^3) 0.03 cm^3   Wound Healing % -100   Wound Assessment Slough   Drainage Amount None (dry)   Odor None   Mirian-wound Assessment Blanchable erythema   Margins Defined edges;Epibole (rolled edges)   Wound Thickness Description not for Pressure Injury Partial thickness   Wound 01/31/24 Ankle Right;Lateral #3   Date First Assessed/Time First Assessed: 01/31/24 1329   Present on Original Admission: No  Wound Approximate Age at First Assessment (Weeks): 1 weeks  Location: Ankle  Wound Location Orientation: Right;Lateral  Wound Description (Comments): #3   Wound Image    Wound Length (cm) 0.4 cm   Wound Width (cm) 0.5 cm   Wound Depth (cm) 0.1 cm   Wound Surface Area (cm^2) 0.2 cm^2   Wound Volume (cm^3) 0.02 cm^3   Wound Assessment Pink/red;Epithelialization   Drainage Amount None (dry)   Odor None   Mirian-wound Assessment Hemosiderin staining (brown yellow)   Margins Attached edges   Wound Thickness Description not for Pressure Injury Partial thickness   Pain Assessment   Pain Assessment None - Denies Pain   Pain Level 0   Patient's Stated Pain Goal 0 - No pain     BP (!) 105/58

## 2024-01-31 NOTE — PATIENT INSTRUCTIONS
Discharge Instructions for  [facility]  [address] [address]  Phone: [phone] Fax: [phone]     NAME:  Chanelle Clay  YOB: 1950  MEDICAL RECORD NUMBER:  [id number]  DATE:  January 31, 2024    WOUND CARE ORDERS:  Right ankle wound - Cleanse with baby shampoo. Rinse and pat dry. Paint betadine to fuad wound. Apply gentamicin ointment to wound bed. Cover with cherise and gauze. Place 2 layer calamine compression wrap. Change weekly in clinic.     Activity:  [x] Elevate leg(s) above the level of the heart when sitting.  [x] Avoid prolonged standing in one place.   [x] Do no get dressing/wrap wet.             Dietary:  [x] Diet as tolerated      [] Diabetic Diet            [] Increase Protein: examples (Meat, cheese, eggs, greek yogurt, fish, nuts)          [] Дмитрий Therapeutic Nutrition Powder     Return Appointment:  [x] Return Appointment: With Dr. Eli Gill in 1 week.   [] Nurse Visit :   [] Ordered tests:      Electronically signed RODRICK MEYERS RN on 01/31/2024 at 1:52 PM      Wound Care Center Information: Should you experience any significant changes in your wound(s) or have questions about your wound care, please contact the [facility] at MONDAY - FRIDAY 8:00 am - 4:30.  If you need help with your wound outside these hours and cannot wait until we are again available, contact your PCP or go to the hospital emergency room.      PLEASE NOTE: IF YOU ARE UNABLE TO OBTAIN WOUND SUPPLIES, CONTINUE TO USE THE SUPPLIES YOU HAVE AVAILABLE UNTIL YOU ARE ABLE TO REACH US. IT IS MOST IMPORTANT TO KEEP THE WOUND COVERED AT ALL TIMES.     Physician Signature:_______________________     Date: ___________ Time:  ____________

## 2024-02-07 ENCOUNTER — HOSPITAL ENCOUNTER (OUTPATIENT)
Facility: HOSPITAL | Age: 74
Discharge: HOME | End: 2024-02-07
Attending: FAMILY MEDICINE
Payer: MEDICARE

## 2024-02-07 VITALS
HEART RATE: 103 BPM | DIASTOLIC BLOOD PRESSURE: 54 MMHG | SYSTOLIC BLOOD PRESSURE: 94 MMHG | TEMPERATURE: 98.9 F | RESPIRATION RATE: 20 BRPM

## 2024-02-07 DIAGNOSIS — L97.512 RIGHT FOOT ULCER, WITH FAT LAYER EXPOSED (HCC): ICD-10-CM

## 2024-02-07 DIAGNOSIS — L97.312 ANKLE ULCER, RIGHT, WITH FAT LAYER EXPOSED (HCC): Primary | ICD-10-CM

## 2024-02-07 PROCEDURE — 11043 DBRDMT MUSC&/FSCA 1ST 20/<: CPT

## 2024-02-07 RX ORDER — CLOBETASOL PROPIONATE 0.5 MG/G
OINTMENT TOPICAL ONCE
OUTPATIENT
Start: 2024-02-07 | End: 2024-02-07

## 2024-02-07 RX ORDER — SODIUM CHLOR/HYPOCHLOROUS ACID 0.033 %
SOLUTION, IRRIGATION IRRIGATION ONCE
OUTPATIENT
Start: 2024-02-07 | End: 2024-02-07

## 2024-02-07 RX ORDER — LIDOCAINE HYDROCHLORIDE 40 MG/ML
SOLUTION TOPICAL ONCE
OUTPATIENT
Start: 2024-02-07 | End: 2024-02-07

## 2024-02-07 RX ORDER — GENTAMICIN SULFATE 1 MG/G
OINTMENT TOPICAL ONCE
OUTPATIENT
Start: 2024-02-07 | End: 2024-02-07

## 2024-02-07 RX ORDER — LIDOCAINE 40 MG/G
CREAM TOPICAL ONCE
OUTPATIENT
Start: 2024-02-07 | End: 2024-02-07

## 2024-02-07 RX ORDER — BACITRACIN ZINC AND POLYMYXIN B SULFATE 500; 1000 [USP'U]/G; [USP'U]/G
OINTMENT TOPICAL ONCE
OUTPATIENT
Start: 2024-02-07 | End: 2024-02-07

## 2024-02-07 RX ORDER — IBUPROFEN 200 MG
TABLET ORAL ONCE
OUTPATIENT
Start: 2024-02-07 | End: 2024-02-07

## 2024-02-07 RX ORDER — LIDOCAINE 50 MG/G
OINTMENT TOPICAL ONCE
OUTPATIENT
Start: 2024-02-07 | End: 2024-02-07

## 2024-02-07 RX ORDER — GINSENG 100 MG
CAPSULE ORAL ONCE
OUTPATIENT
Start: 2024-02-07 | End: 2024-02-07

## 2024-02-07 RX ORDER — BETAMETHASONE DIPROPIONATE 0.5 MG/G
CREAM TOPICAL ONCE
OUTPATIENT
Start: 2024-02-07 | End: 2024-02-07

## 2024-02-07 RX ORDER — LIDOCAINE HYDROCHLORIDE 20 MG/ML
JELLY TOPICAL ONCE
OUTPATIENT
Start: 2024-02-07 | End: 2024-02-07

## 2024-02-07 RX ORDER — GENTAMICIN SULFATE 1 MG/G
OINTMENT TOPICAL ONCE
Status: COMPLETED | OUTPATIENT
Start: 2024-02-07 | End: 2024-02-07

## 2024-02-07 RX ORDER — TRIAMCINOLONE ACETONIDE 1 MG/G
OINTMENT TOPICAL ONCE
OUTPATIENT
Start: 2024-02-07 | End: 2024-02-07

## 2024-02-07 RX ADMIN — GENTAMICIN SULFATE: 1 OINTMENT TOPICAL at 13:27

## 2024-02-07 NOTE — FLOWSHEET NOTE
02/07/24 1347   Right Leg Edema Point of Measurement   Compression Therapy 2 layer compression wrap   Wound 11/29/23 Foot Right;Anterior #2   Date First Assessed/Time First Assessed: 11/29/23 1427   Present on Original Admission: Yes  Wound Approximate Age at First Assessment (Weeks): 40 weeks  Location: Foot  Wound Location Orientation: Right;Anterior  Wound Description (Comments): #2   Dressing/Treatment   (gentamycin, cherise, gauze, two layer calamine compression)   Offloading for Diabetic Foot Ulcers Offloading not required     BP (!) 94/54   Pulse (!) 103   Temp 98.9 °F (37.2 °C) (Temporal)   Resp 20

## 2024-02-07 NOTE — PATIENT INSTRUCTIONS
Discharge Instructions for  [facility]  [address] [address]  Phone: [phone] Fax: [phone]     NAME:  Chanelle Clay  YOB: 1950  MEDICAL RECORD NUMBER:  [id number]  DATE:  February 7, 2024    WOUND CARE ORDERS:  Right ankle wound - Cleanse with baby shampoo. Rinse and pat dry. Paint betadine to fuad wound. Apply gentamicin ointment to wound bed. Cover with cherise and gauze. Place 2 layer calamine compression wrap. Change weekly in clinic.      Activity:  [x] Elevate leg(s) above the level of the heart when sitting.  [x] Avoid prolonged standing in one place.   [x] Do no get dressing/wrap wet.             Dietary:  [x] Diet as tolerated      [] Diabetic Diet            [] Increase Protein: examples (Meat, cheese, eggs, greek yogurt, fish, nuts)          [] Дмитрий Therapeutic Nutrition Powder     Return Appointment:  [x] Return Appointment: With Dr. Eli Gill in 1 week.   [] Nurse Visit :   [] Ordered tests:      Electronically signed RODRICK MEYERS RN on 02/07/2024 at 1:16 PM      Wound Care Center Information: Should you experience any significant changes in your wound(s) or have questions about your wound care, please contact the [facility] at MONDAY - FRIDAY 8:00 am - 4:30.  If you need help with your wound outside these hours and cannot wait until we are again available, contact your PCP or go to the hospital emergency room.      PLEASE NOTE: IF YOU ARE UNABLE TO OBTAIN WOUND SUPPLIES, CONTINUE TO USE THE SUPPLIES YOU HAVE AVAILABLE UNTIL YOU ARE ABLE TO REACH US. IT IS MOST IMPORTANT TO KEEP THE WOUND COVERED AT ALL TIMES.     Physician Signature:_______________________     Date: ___________ Time:  ____________

## 2024-02-07 NOTE — PROGRESS NOTES
Wound Center  Progress Note / Procedure Note      Chief Complaint:  Chanelle Clay is a 73 y.o. female  with Right foot and ankle wound since February 2023      Assessment/Plan     73 y.o. female with     -R ankle anterior chronic ulcer.  Full thickness  Remains healed- use betadine today    -R foot anterior chronic ulcer.  Full thickness  Starting to see granulation  Slough/granular  Debrided as below    -left ankle  No open areas  Compression  Vaseline    Leg swelling  Elevate  compression    -Nicotine dependence  Effects of nicotine on wound healing- aware      -Vascular  Normal ABIs/TBIs/ in April      Following discussed with patient / sister  Needs :  Serial debridement- prn    Good local wound care  Dressing: gentamicin, collagen  Frequency : once weekly    unnas    Patient/  understood and agrees with plan. Questions answered.    Follow up with me in 1 week    Subjective:   Since last visit  Left ankle wound?      HPI:     Wound since Feb   Acquired from having laser therapy on feet for pain  Dr Mancini's office did not think it was due to laser  Being treated there since then  Not healing so came here  Has tried collagen, iodine and some other products    Has been on 2 course of abx, last one 4 weeks ago    History/Chart/Medications reviewed    Current wound care:See flowsheet  Offloading wound: Yes  Appetite: good  Wound associated pain: See flowsheet  Diabetic: no  Smoker: yes  ROS: no N/V/D, no T/chills; no local rash, no chest pain or shortness of breath, no headache or dizzyness      Objective:     Physical Exam:   See flowsheet / nursing notes for vitals  Vitals:    02/07/24 1304   BP: (!) 94/54   Pulse: (!) 103   Resp:    Temp:      General: NAD. Hygiene good  Psych: cooperative. No anxiety or depression. Normal mood and affect.  Neuro: alert and oriented to person/place/situation. Otherwise nonfocal.  Derm: Normal  turgor for age, dry skin  HEENT: Normocephalic, atraumatic. EOMI. Conjunctiva

## 2024-02-07 NOTE — FLOWSHEET NOTE
02/07/24 1303   Right Leg Edema Point of Measurement   Leg circumference 35 cm   Ankle circumference 19 cm   Compression Therapy 2 layer compression wrap   RLE Neurovascular Assessment   Capillary Refill Less than/Equal to 3 seconds   Color Appropriate for Ethnicity   Temperature Warm   RLE Sensation  Full sensation   R Pedal Pulse +2   Wound 11/29/23 Foot Right;Anterior #2   Date First Assessed/Time First Assessed: 11/29/23 1427   Present on Original Admission: Yes  Wound Approximate Age at First Assessment (Weeks): 40 weeks  Location: Foot  Wound Location Orientation: Right;Anterior  Wound Description (Comments): #2   Wound Image    Dressing Status Old drainage noted;Intact   Wound Cleansed Soap and water   Offloading for Diabetic Foot Ulcers Offloading not required   Wound Length (cm) 0.5 cm   Wound Width (cm) 0.2 cm   Wound Depth (cm) 0.3 cm   Wound Surface Area (cm^2) 0.1 cm^2   Change in Wound Size % (l*w) 33.33   Wound Volume (cm^3) 0.03 cm^3   Wound Healing % -100   Wound Assessment Slough;Granulation tissue   Drainage Amount Small (< 25%)   Drainage Description Serosanguinous   Odor None   Mirian-wound Assessment Blanchable erythema   Margins Defined edges   Wound Thickness Description not for Pressure Injury Partial thickness   Pain Assessment   Pain Assessment None - Denies Pain     BP (!) 94/54   Pulse (!) 103   Temp 98.9 °F (37.2 °C) (Temporal)   Resp 20

## 2024-02-14 ENCOUNTER — HOSPITAL ENCOUNTER (OUTPATIENT)
Facility: HOSPITAL | Age: 74
Discharge: HOME | End: 2024-02-14
Attending: FAMILY MEDICINE
Payer: MEDICARE

## 2024-02-14 VITALS
TEMPERATURE: 97.4 F | HEART RATE: 81 BPM | RESPIRATION RATE: 18 BRPM | DIASTOLIC BLOOD PRESSURE: 49 MMHG | SYSTOLIC BLOOD PRESSURE: 95 MMHG

## 2024-02-14 DIAGNOSIS — L97.312 ANKLE ULCER, RIGHT, WITH FAT LAYER EXPOSED (HCC): Primary | ICD-10-CM

## 2024-02-14 DIAGNOSIS — L97.512 RIGHT FOOT ULCER, WITH FAT LAYER EXPOSED (HCC): ICD-10-CM

## 2024-02-14 PROCEDURE — 11042 DBRDMT SUBQ TIS 1ST 20SQCM/<: CPT

## 2024-02-14 RX ORDER — LIDOCAINE 50 MG/G
OINTMENT TOPICAL ONCE
OUTPATIENT
Start: 2024-02-14 | End: 2024-02-14

## 2024-02-14 RX ORDER — BACITRACIN ZINC AND POLYMYXIN B SULFATE 500; 1000 [USP'U]/G; [USP'U]/G
OINTMENT TOPICAL ONCE
OUTPATIENT
Start: 2024-02-14 | End: 2024-02-14

## 2024-02-14 RX ORDER — SODIUM CHLOR/HYPOCHLOROUS ACID 0.033 %
SOLUTION, IRRIGATION IRRIGATION ONCE
OUTPATIENT
Start: 2024-02-14 | End: 2024-02-14

## 2024-02-14 RX ORDER — CLOBETASOL PROPIONATE 0.5 MG/G
OINTMENT TOPICAL ONCE
OUTPATIENT
Start: 2024-02-14 | End: 2024-02-14

## 2024-02-14 RX ORDER — GENTAMICIN SULFATE 1 MG/G
OINTMENT TOPICAL ONCE
OUTPATIENT
Start: 2024-02-14 | End: 2024-02-14

## 2024-02-14 RX ORDER — TRIAMCINOLONE ACETONIDE 1 MG/G
OINTMENT TOPICAL ONCE
OUTPATIENT
Start: 2024-02-14 | End: 2024-02-14

## 2024-02-14 RX ORDER — BETAMETHASONE DIPROPIONATE 0.5 MG/G
CREAM TOPICAL ONCE
OUTPATIENT
Start: 2024-02-14 | End: 2024-02-14

## 2024-02-14 RX ORDER — LIDOCAINE HYDROCHLORIDE 20 MG/ML
JELLY TOPICAL ONCE
OUTPATIENT
Start: 2024-02-14 | End: 2024-02-14

## 2024-02-14 RX ORDER — LIDOCAINE 40 MG/G
CREAM TOPICAL ONCE
OUTPATIENT
Start: 2024-02-14 | End: 2024-02-14

## 2024-02-14 RX ORDER — LIDOCAINE HYDROCHLORIDE 40 MG/ML
SOLUTION TOPICAL ONCE
OUTPATIENT
Start: 2024-02-14 | End: 2024-02-14

## 2024-02-14 RX ORDER — GINSENG 100 MG
CAPSULE ORAL ONCE
OUTPATIENT
Start: 2024-02-14 | End: 2024-02-14

## 2024-02-14 RX ORDER — IBUPROFEN 200 MG
TABLET ORAL ONCE
OUTPATIENT
Start: 2024-02-14 | End: 2024-02-14

## 2024-02-14 NOTE — FLOWSHEET NOTE
02/14/24 1429   Right Leg Edema Point of Measurement   Compression Therapy 2 layer compression wrap   Wound 11/29/23 Foot Right;Anterior #2   Date First Assessed/Time First Assessed: 11/29/23 1427   Present on Original Admission: Yes  Wound Approximate Age at First Assessment (Weeks): 40 weeks  Location: Foot  Wound Location Orientation: Right;Anterior  Wound Description (Comments): #2   Dressing/Treatment   (betadine, gentamycin, cherise, gauze, two layer calamine compression wrap)   Offloading for Diabetic Foot Ulcers Offloading not required

## 2024-02-14 NOTE — PATIENT INSTRUCTIONS
Discharge Instructions for  [facility]  [address] [address]  Phone: [phone] Fax: [phone]     NAME:  Chanelle Clay  YOB: 1950  MEDICAL RECORD NUMBER:  [id number]  DATE:  February 14, 2024    WOUND CARE ORDERS:  Right ankle wound - Cleanse with baby shampoo. Rinse and pat dry. Paint betadine to fuad wound. Apply gentamicin ointment to wound bed. Cover with cherise and gauze. Place 2 layer calamine compression wrap. Change weekly in clinic.      Activity:  [x] Elevate leg(s) above the level of the heart when sitting.  [x] Avoid prolonged standing in one place.   [x] Do no get dressing/wrap wet.             Dietary:  [x] Diet as tolerated      [] Diabetic Diet            [] Increase Protein: examples (Meat, cheese, eggs, greek yogurt, fish, nuts)          [] Дмитрий Therapeutic Nutrition Powder     Return Appointment:  [x] Return Appointment: With Dr. Eli Gill in 1 week.   [] Nurse Visit :   [] Ordered tests:           Wound Care Center Information: Should you experience any significant changes in your wound(s) or have questions about your wound care, please contact the [facility] at MONDAY - FRIDAY 8:00 am - 4:30.  If you need help with your wound outside these hours and cannot wait until we are again available, contact your PCP or go to the hospital emergency room.      PLEASE NOTE: IF YOU ARE UNABLE TO OBTAIN WOUND SUPPLIES, CONTINUE TO USE THE SUPPLIES YOU HAVE AVAILABLE UNTIL YOU ARE ABLE TO REACH US. IT IS MOST IMPORTANT TO KEEP THE WOUND COVERED AT ALL TIMES.     Physician Signature:_______________________     Date: ___________ Time:  ____________

## 2024-02-14 NOTE — PROGRESS NOTES
Prior to Admission medications    Medication Sig Start Date End Date Taking? Authorizing Provider   Cholecalciferol (VITAMIN D3) 125 MCG (5000 UT) TABS Take 1 tablet by mouth    Franky Carrillo MD   Cyanocobalamin (B-12) 3000 MCG CAPS Take 1 Dose by mouth    Franky Carrillo MD   albuterol sulfate HFA (VENTOLIN HFA) 108 (90 Base) MCG/ACT inhaler Inhale 2 puffs into the lungs every 6 hours as needed for Wheezing    ProviderFranky MD   Biotin 5000 MCG CAPS Take 1 Dose by mouth    Franky Carrillo MD   cetirizine (ZYRTEC) 10 MG tablet Take 1 tablet by mouth daily    Automatic Reconciliation, Ar   rosuvastatin (CRESTOR) 20 MG tablet Take 1 tablet by mouth nightly    Automatic Reconciliation, Ar   thyroid (ARMOUR) 30 MG tablet Take 1 tablet by mouth daily    Automatic Reconciliation, Ar      Allergies   Allergen Reactions    Morphine Other (See Comments)     \"whacko\"    Sulfa Antibiotics Itching     Nervous, anxious    Penicillins Rash     Hives          Signed By: Eli Gill MD      02/14/24

## 2024-02-21 ENCOUNTER — HOSPITAL ENCOUNTER (OUTPATIENT)
Facility: HOSPITAL | Age: 74
Discharge: HOME | End: 2024-02-21
Attending: FAMILY MEDICINE
Payer: MEDICARE

## 2024-02-21 VITALS
HEART RATE: 86 BPM | DIASTOLIC BLOOD PRESSURE: 72 MMHG | RESPIRATION RATE: 18 BRPM | SYSTOLIC BLOOD PRESSURE: 102 MMHG | TEMPERATURE: 97.8 F

## 2024-02-21 DIAGNOSIS — L97.312 ANKLE ULCER, RIGHT, WITH FAT LAYER EXPOSED (HCC): Primary | ICD-10-CM

## 2024-02-21 DIAGNOSIS — L97.512 RIGHT FOOT ULCER, WITH FAT LAYER EXPOSED (HCC): ICD-10-CM

## 2024-02-21 PROCEDURE — 11042 DBRDMT SUBQ TIS 1ST 20SQCM/<: CPT

## 2024-02-21 RX ORDER — LIDOCAINE 40 MG/G
CREAM TOPICAL ONCE
OUTPATIENT
Start: 2024-02-21 | End: 2024-02-21

## 2024-02-21 RX ORDER — IBUPROFEN 200 MG
TABLET ORAL ONCE
OUTPATIENT
Start: 2024-02-21 | End: 2024-02-21

## 2024-02-21 RX ORDER — LIDOCAINE HYDROCHLORIDE 40 MG/ML
SOLUTION TOPICAL ONCE
OUTPATIENT
Start: 2024-02-21 | End: 2024-02-21

## 2024-02-21 RX ORDER — LIDOCAINE HYDROCHLORIDE 20 MG/ML
JELLY TOPICAL ONCE
OUTPATIENT
Start: 2024-02-21 | End: 2024-02-21

## 2024-02-21 RX ORDER — GINSENG 100 MG
CAPSULE ORAL ONCE
OUTPATIENT
Start: 2024-02-21 | End: 2024-02-21

## 2024-02-21 RX ORDER — GENTAMICIN SULFATE 1 MG/G
OINTMENT TOPICAL ONCE
OUTPATIENT
Start: 2024-02-21 | End: 2024-02-21

## 2024-02-21 RX ORDER — SODIUM CHLOR/HYPOCHLOROUS ACID 0.033 %
SOLUTION, IRRIGATION IRRIGATION ONCE
OUTPATIENT
Start: 2024-02-21 | End: 2024-02-21

## 2024-02-21 RX ORDER — CLOBETASOL PROPIONATE 0.5 MG/G
OINTMENT TOPICAL ONCE
OUTPATIENT
Start: 2024-02-21 | End: 2024-02-21

## 2024-02-21 RX ORDER — LIDOCAINE 50 MG/G
OINTMENT TOPICAL ONCE
OUTPATIENT
Start: 2024-02-21 | End: 2024-02-21

## 2024-02-21 RX ORDER — BETAMETHASONE DIPROPIONATE 0.5 MG/G
CREAM TOPICAL ONCE
OUTPATIENT
Start: 2024-02-21 | End: 2024-02-21

## 2024-02-21 RX ORDER — TRIAMCINOLONE ACETONIDE 1 MG/G
OINTMENT TOPICAL ONCE
OUTPATIENT
Start: 2024-02-21 | End: 2024-02-21

## 2024-02-21 RX ORDER — BACITRACIN ZINC AND POLYMYXIN B SULFATE 500; 1000 [USP'U]/G; [USP'U]/G
OINTMENT TOPICAL ONCE
OUTPATIENT
Start: 2024-02-21 | End: 2024-02-21

## 2024-02-21 ASSESSMENT — PAIN SCALES - GENERAL: PAINLEVEL_OUTOF10: 0

## 2024-02-21 NOTE — FLOWSHEET NOTE
02/21/24 1326   Right Leg Edema Point of Measurement   Leg circumference 35 cm   Ankle circumference 19 cm   Compression Therapy 2 layer compression wrap   RLE Neurovascular Assessment   Capillary Refill Less than/Equal to 3 seconds   Color Appropriate for Ethnicity   Temperature Warm   R Pedal Pulse +2   Wound 11/29/23 Foot Right;Anterior #2   Date First Assessed/Time First Assessed: 11/29/23 1427   Present on Original Admission: Yes  Wound Approximate Age at First Assessment (Weeks): 40 weeks  Location: Foot  Wound Location Orientation: Right;Anterior  Wound Description (Comments): #2   Wound Image    Dressing Status Old drainage noted   Wound Cleansed Soap and water   Offloading for Diabetic Foot Ulcers Offloading not required   Wound Length (cm) 0.4 cm   Wound Width (cm) 0.1 cm   Wound Depth (cm) 0.1 cm   Wound Surface Area (cm^2) 0.04 cm^2   Change in Wound Size % (l*w) 73.33   Wound Volume (cm^3) 0.004 cm^3   Wound Healing % 73   Wound Assessment Slough   Drainage Amount Scant (moist but unmeasurable)   Drainage Description Serous   Odor None   Mirian-wound Assessment Intact   Margins Attached edges   Wound Thickness Description not for Pressure Injury Full thickness   Pain Assessment   Pain Assessment None - Denies Pain   Pain Level 0     /72   Pulse 86   Temp 97.8 °F (36.6 °C) (Temporal)   Resp 18

## 2024-02-21 NOTE — FLOWSHEET NOTE
02/21/24 1402   Right Leg Edema Point of Measurement   Compression Therapy 2 layer compression wrap   Wound 11/29/23 Foot Right;Anterior #2   Date First Assessed/Time First Assessed: 11/29/23 1427   Present on Original Admission: Yes  Wound Approximate Age at First Assessment (Weeks): 40 weeks  Location: Foot  Wound Location Orientation: Right;Anterior  Wound Description (Comments): #2   Dressing Status New dressing applied   Dressing/Treatment Betadine swabs/povidone iodine;Gauze dressing/dressing sponge   Offloading for Diabetic Foot Ulcers Offloading not required     Discharge Condition: Stable    Pain: 0    Ambulatory Status: Wheelchair    Discharge Destination: home    Transportation:car    Accompanied by: FAMILY    Discharge instructions reviewed with FAMILY and copy or written instructions have been provided. All questions/concerns have been addressed at this time.     Multilayer Compression Wrap   (Not Unna) Below the Knee    NAME:  Chanelle Clay  YOB: 1950  MEDICAL RECORD NUMBER:  [id number]  DATE:  2/21/2024    Multilayer compression wrap: Removed old Multilayer wrap if indicated and wash leg with mild soap/water.  Applied moisturizing agent to dry skin as needed.   Applied primary and secondary dressing as ordered.  Applied multilayered dressing below the knee to right lower leg.  Instructed patient/caregiver not to remove dressing and to keep it clean and dry.   Instructed patient/caregiver on complications to report to provider, such as pain, numbness in toes, heavy drainage, and slippage of dressing.  Instructed patient on purpose of compression dressing and on activity and exercise recommendations.      Electronically signed by Carola Horn RN on 2/21/2024 at 2:03 PM

## 2024-02-21 NOTE — PROGRESS NOTES
Wound Center  Progress Note / Procedure Note      Chief Complaint:  Chanelle Clay is a 73 y.o. female  with Right foot and ankle wound since February 2023      Assessment/Plan     73 y.o. female with     -R ankle anterior chronic ulcer.  Full thickness  Remains healed    -R foot anterior chronic ulcer.  Full thickness  smaller, filling in  Slough/granular  Debrided as below      Leg swelling  Elevate  compression    -Nicotine dependence  Effects of nicotine on wound healing- aware      -Vascular  Normal ABIs/TBIs/ in April      Following discussed with patient / sister  Needs :  Serial debridement- prn    Good local wound care  Dressing: D./C gentamicin, collagen; START betadine wet to dry  Frequency : once weekly    [place]    Patient/  understood and agrees with plan. Questions answered.    Follow up with me in 1 week    Subjective:   Since last visit  No new issues      HPI:     Wound since Feb   Acquired from having laser therapy on feet for pain  Dr Mancini's office did not think it was due to laser  Being treated there since then  Not healing so came here  Has tried collagen, iodine and some other products    Has been on 2 course of abx, last one 4 weeks ago    History/Chart/Medications reviewed    Current wound care:See flowsheet  Offloading wound: Yes  Appetite: good  Wound associated pain: See flowsheet  Diabetic: no  Smoker: yes  ROS: no N/V/D, no T/chills; no local rash, no chest pain or shortness of breath, no headache or dizzyness      Objective:     Physical Exam:   See flowsheet / nursing notes for vitals  Vitals:    02/21/24 1326   BP: 102/72   Pulse: 86   Resp: 18   Temp: 97.8 °F (36.6 °C)     General: NAD. Hygiene good  Psych: cooperative. No anxiety or depression. Normal mood and affect.  Neuro: alert and oriented to person/place/situation. Otherwise nonfocal.  Derm: Normal  turgor for age, dry skin  HEENT: Normocephalic, atraumatic. EOMI. Conjunctiva clear. No scleral icterus.  Neck: Normal

## 2024-02-21 NOTE — PATIENT INSTRUCTIONS
Discharge Instructions for  [facility]  [address] [address]  Phone: [phone] Fax: [phone]     NAME:  Chanelle Clay  YOB: 1950  MEDICAL RECORD NUMBER:  [id number]  DATE:  February 21, 2024    WOUND CARE ORDERS:  Right ankle wound - Cleanse with baby shampoo. Rinse and pat dry. Betadine wet to dry to wound.  Place 2 layer calamine compression wrap. Change weekly in clinic.      Activity:  [x] Elevate leg(s) above the level of the heart when sitting.  [x] Avoid prolonged standing in one place.   [x] Do no get dressing/wrap wet.             Dietary:  [x] Diet as tolerated      [] Diabetic Diet            [] Increase Protein: examples (Meat, cheese, eggs, greek yogurt, fish, nuts)          [] Дмитрий Therapeutic Nutrition Powder     Return Appointment:  [x] Return Appointment: With Dr. Eli Gill in 1 week.   [] Nurse Visit :   [] Ordered tests:           Wound Care Center Information: Should you experience any significant changes in your wound(s) or have questions about your wound care, please contact the [facility] at MONDAY - FRIDAY 8:00 am - 4:30.  If you need help with your wound outside these hours and cannot wait until we are again available, contact your PCP or go to the hospital emergency room.      PLEASE NOTE: IF YOU ARE UNABLE TO OBTAIN WOUND SUPPLIES, CONTINUE TO USE THE SUPPLIES YOU HAVE AVAILABLE UNTIL YOU ARE ABLE TO REACH US. IT IS MOST IMPORTANT TO KEEP THE WOUND COVERED AT ALL TIMES.     Physician Signature:_______________________     Date: ___________ Time:  ____________

## 2024-02-28 ENCOUNTER — HOSPITAL ENCOUNTER (OUTPATIENT)
Facility: HOSPITAL | Age: 74
Discharge: HOME | End: 2024-02-28
Attending: FAMILY MEDICINE
Payer: MEDICARE

## 2024-02-28 VITALS
SYSTOLIC BLOOD PRESSURE: 112 MMHG | RESPIRATION RATE: 18 BRPM | DIASTOLIC BLOOD PRESSURE: 73 MMHG | TEMPERATURE: 98.2 F | HEART RATE: 91 BPM

## 2024-02-28 DIAGNOSIS — L97.512 RIGHT FOOT ULCER, WITH FAT LAYER EXPOSED (HCC): ICD-10-CM

## 2024-02-28 DIAGNOSIS — L97.312 ANKLE ULCER, RIGHT, WITH FAT LAYER EXPOSED (HCC): Primary | ICD-10-CM

## 2024-02-28 PROCEDURE — 11042 DBRDMT SUBQ TIS 1ST 20SQCM/<: CPT

## 2024-02-28 RX ORDER — LIDOCAINE HYDROCHLORIDE 40 MG/ML
SOLUTION TOPICAL ONCE
OUTPATIENT
Start: 2024-02-28 | End: 2024-02-28

## 2024-02-28 RX ORDER — LIDOCAINE 40 MG/G
CREAM TOPICAL ONCE
OUTPATIENT
Start: 2024-02-28 | End: 2024-02-28

## 2024-02-28 RX ORDER — GINSENG 100 MG
CAPSULE ORAL ONCE
OUTPATIENT
Start: 2024-02-28 | End: 2024-02-28

## 2024-02-28 RX ORDER — BETAMETHASONE DIPROPIONATE 0.5 MG/G
CREAM TOPICAL ONCE
OUTPATIENT
Start: 2024-02-28 | End: 2024-02-28

## 2024-02-28 RX ORDER — CLOBETASOL PROPIONATE 0.5 MG/G
OINTMENT TOPICAL ONCE
OUTPATIENT
Start: 2024-02-28 | End: 2024-02-28

## 2024-02-28 RX ORDER — LIDOCAINE 50 MG/G
OINTMENT TOPICAL ONCE
OUTPATIENT
Start: 2024-02-28 | End: 2024-02-28

## 2024-02-28 RX ORDER — TRIAMCINOLONE ACETONIDE 1 MG/G
OINTMENT TOPICAL ONCE
OUTPATIENT
Start: 2024-02-28 | End: 2024-02-28

## 2024-02-28 RX ORDER — LIDOCAINE HYDROCHLORIDE 20 MG/ML
JELLY TOPICAL ONCE
OUTPATIENT
Start: 2024-02-28 | End: 2024-02-28

## 2024-02-28 RX ORDER — IBUPROFEN 200 MG
TABLET ORAL ONCE
OUTPATIENT
Start: 2024-02-28 | End: 2024-02-28

## 2024-02-28 RX ORDER — SODIUM CHLOR/HYPOCHLOROUS ACID 0.033 %
SOLUTION, IRRIGATION IRRIGATION ONCE
OUTPATIENT
Start: 2024-02-28 | End: 2024-02-28

## 2024-02-28 RX ORDER — BACITRACIN ZINC AND POLYMYXIN B SULFATE 500; 1000 [USP'U]/G; [USP'U]/G
OINTMENT TOPICAL ONCE
OUTPATIENT
Start: 2024-02-28 | End: 2024-02-28

## 2024-02-28 RX ORDER — GENTAMICIN SULFATE 1 MG/G
OINTMENT TOPICAL ONCE
OUTPATIENT
Start: 2024-02-28 | End: 2024-02-28

## 2024-02-28 NOTE — PROGRESS NOTES
Wound Center  Progress Note / Procedure Note      Chief Complaint:  Chanelle Clay is a 73 y.o. female  with Right foot and ankle wound since February 2023      Assessment/Plan     73 y.o. female with     -R ankle anterior chronic ulcer.  Full thickness  Remains healed    -R foot anterior chronic ulcer.  Full thickness  smaller, filling in  Slough/granular  Debrided as below      Leg swelling  Elevate  compression    -Nicotine dependence  Effects of nicotine on wound healing- aware      -Vascular  Normal ABIs/TBIs/ in April      Following discussed with patient / sister  Needs :  Serial debridement- prn    Good local wound care  Dressing: D./C  betadine wet to dry; STYART medihoney gel  Frequency : once weekly  2 layer calamine    Patient/  understood and agrees with plan. Questions answered.    Follow up with me in 1 week    Subjective:   Since last visit  No new issues      HPI:     Wound since Feb   Acquired from having laser therapy on feet for pain  Dr Mancini's office did not think it was due to laser  Being treated there since then  Not healing so came here  Has tried collagen, iodine and some other products    Has been on 2 course of abx, last one 4 weeks ago    History/Chart/Medications reviewed    Current wound care:See flowsheet  Offloading wound: Yes  Appetite: good  Wound associated pain: See flowsheet  Diabetic: no  Smoker: yes  ROS: no N/V/D, no T/chills; no local rash, no chest pain or shortness of breath, no headache or dizzyness      Objective:     Physical Exam:   See flowsheet / nursing notes for vitals  Vitals:    02/28/24 1345   BP: 112/73   Pulse: 91   Resp: 18   Temp: 98.2 °F (36.8 °C)     General: NAD. Hygiene good  Psych: cooperative. No anxiety or depression. Normal mood and affect.  Neuro: alert and oriented to person/place/situation. Otherwise nonfocal.  Derm: Normal  turgor for age, dry skin  HEENT: Normocephalic, atraumatic. EOMI. Conjunctiva clear. No scleral icterus.  Neck: Normal

## 2024-02-28 NOTE — PATIENT INSTRUCTIONS
Discharge Instructions for  [facility]  [address] [address]  Phone: [phone] Fax: [phone]     NAME:  Chanelle Clay  YOB: 1950  MEDICAL RECORD NUMBER:  [id number]  DATE:  February 28, 2024    WOUND CARE ORDERS:  Right ankle wound - Cleanse with baby shampoo. Rinse and pat dry. Medihoney gel to wound bed.  Cover with gauze. Place 2 layer calamine compression wrap. Change weekly in clinic.      Activity:  [x] Elevate leg(s) above the level of the heart when sitting.  [x] Avoid prolonged standing in one place.   [x] Do no get dressing/wrap wet.             Dietary:  [x] Diet as tolerated      [] Diabetic Diet            [] Increase Protein: examples (Meat, cheese, eggs, greek yogurt, fish, nuts)          [] Дмитрий Therapeutic Nutrition Powder     Return Appointment:  [x] Return Appointment: With Dr. Eli Gill in 1 week.   [] Nurse Visit :   [] Ordered tests:           Wound Care Center Information: Should you experience any significant changes in your wound(s) or have questions about your wound care, please contact the [facility] at MONDAY - FRIDAY 8:00 am - 4:30.  If you need help with your wound outside these hours and cannot wait until we are again available, contact your PCP or go to the hospital emergency room.      PLEASE NOTE: IF YOU ARE UNABLE TO OBTAIN WOUND SUPPLIES, CONTINUE TO USE THE SUPPLIES YOU HAVE AVAILABLE UNTIL YOU ARE ABLE TO REACH US. IT IS MOST IMPORTANT TO KEEP THE WOUND COVERED AT ALL TIMES.     Physician Signature:_______________________     Date: ___________ Time:  ____________

## 2024-03-06 ENCOUNTER — HOSPITAL ENCOUNTER (OUTPATIENT)
Facility: HOSPITAL | Age: 74
Discharge: HOME | End: 2024-03-06
Attending: FAMILY MEDICINE
Payer: MEDICARE

## 2024-03-06 VITALS
DIASTOLIC BLOOD PRESSURE: 73 MMHG | RESPIRATION RATE: 18 BRPM | SYSTOLIC BLOOD PRESSURE: 115 MMHG | HEART RATE: 102 BPM | TEMPERATURE: 98.6 F

## 2024-03-06 DIAGNOSIS — I87.2 VENOUS STASIS DERMATITIS OF BOTH LOWER EXTREMITIES: ICD-10-CM

## 2024-03-06 DIAGNOSIS — L97.312 ANKLE ULCER, RIGHT, WITH FAT LAYER EXPOSED (HCC): Primary | ICD-10-CM

## 2024-03-06 DIAGNOSIS — L97.512 RIGHT FOOT ULCER, WITH FAT LAYER EXPOSED (HCC): ICD-10-CM

## 2024-03-06 PROCEDURE — 99212 OFFICE O/P EST SF 10 MIN: CPT

## 2024-03-06 PROCEDURE — 99213 OFFICE O/P EST LOW 20 MIN: CPT | Performed by: SURGERY

## 2024-03-06 RX ORDER — GINSENG 100 MG
CAPSULE ORAL ONCE
Status: CANCELLED | OUTPATIENT
Start: 2024-03-06 | End: 2024-03-06

## 2024-03-06 RX ORDER — LIDOCAINE 50 MG/G
OINTMENT TOPICAL ONCE
Status: CANCELLED | OUTPATIENT
Start: 2024-03-06 | End: 2024-03-06

## 2024-03-06 RX ORDER — GENTAMICIN SULFATE 1 MG/G
OINTMENT TOPICAL ONCE
Status: CANCELLED | OUTPATIENT
Start: 2024-03-06 | End: 2024-03-06

## 2024-03-06 RX ORDER — TRIAMCINOLONE ACETONIDE 1 MG/G
OINTMENT TOPICAL ONCE
Status: CANCELLED | OUTPATIENT
Start: 2024-03-06 | End: 2024-03-06

## 2024-03-06 RX ORDER — CLOBETASOL PROPIONATE 0.5 MG/G
OINTMENT TOPICAL ONCE
Status: CANCELLED | OUTPATIENT
Start: 2024-03-06 | End: 2024-03-06

## 2024-03-06 RX ORDER — SODIUM CHLOR/HYPOCHLOROUS ACID 0.033 %
SOLUTION, IRRIGATION IRRIGATION ONCE
Status: CANCELLED | OUTPATIENT
Start: 2024-03-06 | End: 2024-03-06

## 2024-03-06 RX ORDER — LIDOCAINE HYDROCHLORIDE 20 MG/ML
JELLY TOPICAL ONCE
Status: CANCELLED | OUTPATIENT
Start: 2024-03-06 | End: 2024-03-06

## 2024-03-06 RX ORDER — BACITRACIN ZINC AND POLYMYXIN B SULFATE 500; 1000 [USP'U]/G; [USP'U]/G
OINTMENT TOPICAL ONCE
Status: CANCELLED | OUTPATIENT
Start: 2024-03-06 | End: 2024-03-06

## 2024-03-06 RX ORDER — LIDOCAINE HYDROCHLORIDE 40 MG/ML
SOLUTION TOPICAL ONCE
Status: CANCELLED | OUTPATIENT
Start: 2024-03-06 | End: 2024-03-06

## 2024-03-06 RX ORDER — BETAMETHASONE DIPROPIONATE 0.5 MG/G
CREAM TOPICAL ONCE
Status: CANCELLED | OUTPATIENT
Start: 2024-03-06 | End: 2024-03-06

## 2024-03-06 RX ORDER — LIDOCAINE 40 MG/G
CREAM TOPICAL ONCE
Status: CANCELLED | OUTPATIENT
Start: 2024-03-06 | End: 2024-03-06

## 2024-03-06 RX ORDER — IBUPROFEN 200 MG
TABLET ORAL ONCE
Status: CANCELLED | OUTPATIENT
Start: 2024-03-06 | End: 2024-03-06

## 2024-03-06 NOTE — FLOWSHEET NOTE
03/06/24 1309   Anesthetic   Anesthetic 4% Lidocaine Cream   Right Leg Edema Point of Measurement   Leg circumference 34 cm   Ankle circumference 19 cm   Compression Therapy 2 layer compression wrap   RLE Neurovascular Assessment   Capillary Refill Less than/Equal to 3 seconds   Color Appropriate for Ethnicity   Temperature Warm   RLE Sensation  Full sensation   R Pedal Pulse +2   Wound 11/29/23 Foot Right;Anterior #2   Date First Assessed/Time First Assessed: 11/29/23 1427   Present on Original Admission: Yes  Wound Approximate Age at First Assessment (Weeks): 40 weeks  Location: Foot  Wound Location Orientation: Right;Anterior  Wound Description (Comments): #2   Wound Image    Dressing Status Intact;Old drainage noted   Wound Cleansed Soap and water   Offloading for Diabetic Foot Ulcers Offloading not required   Wound Length (cm) 0.3 cm   Wound Width (cm) 0.2 cm   Wound Depth (cm) 0.1 cm   Wound Surface Area (cm^2) 0.06 cm^2   Change in Wound Size % (l*w) 60   Wound Volume (cm^3) 0.006 cm^3   Wound Healing % 60   Wound Assessment Slough   Drainage Amount None (dry)   Odor None   Mirian-wound Assessment Blanchable erythema;Intact   Margins Attached edges   Wound Thickness Description not for Pressure Injury Full thickness   Pain Assessment   Pain Assessment None - Denies Pain     /73   Pulse (!) 102   Temp 98.6 °F (37 °C) (Tympanic)   Resp 18

## 2024-03-06 NOTE — PATIENT INSTRUCTIONS
Discharge Instructions for  [facility]  [address] [address]  Phone: [phone] Fax: [phone]     NAME:  Chanelle Clay  YOB: 1950  MEDICAL RECORD NUMBER:  [id number]  DATE:  March 6, 2024    WOUND CARE ORDERS:  Cover with border foam dressing and single tubigrip size F in clinic. Moisturize daily and wear your 15-20 mmHg compression stockings daily.      Activity:  [x] Elevate leg(s) above the level of the heart when sitting.  [x] Avoid prolonged standing in one place.   [] Do no get dressing/wrap wet.             Dietary:  [x] Diet as tolerated      [] Diabetic Diet            [] Increase Protein: examples (Meat, cheese, eggs, greek yogurt, fish, nuts)          [] Дмитрий Therapeutic Nutrition Powder     Return Appointment:  [x] Return Appointment: As needed  [] Nurse Visit :   [] Ordered tests:        Wound Care Center Information: Should you experience any significant changes in your wound(s) or have questions about your wound care, please contact the [facility] at MONDAY - FRIDAY 8:00 am - 4:30.  If you need help with your wound outside these hours and cannot wait until we are again available, contact your PCP or go to the hospital emergency room.      PLEASE NOTE: IF YOU ARE UNABLE TO OBTAIN WOUND SUPPLIES, CONTINUE TO USE THE SUPPLIES YOU HAVE AVAILABLE UNTIL YOU ARE ABLE TO REACH US. IT IS MOST IMPORTANT TO KEEP THE WOUND COVERED AT ALL TIMES.     Physician Signature:_______________________     Date: ___________ Time:  ____________

## 2024-03-06 NOTE — FLOWSHEET NOTE
03/06/24 1337   [REMOVED] Wound 11/29/23 Foot Right;Anterior #2   Final Assessment Date/Final Assessment Time: 03/06/24 1325  Date First Assessed/Time First Assessed: 11/29/23 1427   Present on Original Admission: Yes  Wound Approximate Age at First Assessment (Weeks): 40 weeks  Location: Foot  Wound Location [place]...   Dressing/Treatment   (border foam, tubi size F)   Offloading for Diabetic Foot Ulcers Offloading not ordered

## 2024-03-06 NOTE — PROGRESS NOTES
WOUND CARE PROGRESS       Subjective:     Patient   Chanelle Clay is a 73 y.o. female  with Right foot and ankle wound since 2023        Assessment/Plan      73 y.o. female with      -R ankle anterior chronic ulcer.  Full thickness  Remains healed     -R foot anterior chronic ulcer.  Full thickness  smaller, filling in  Slough/granular  Debrided as below        Leg swelling  Elevate  compression     -Nicotine dependence  Effects of nicotine on wound healing- aware        -Vascular  Normal ABIs/TBIs/ in April        Following discussed with patient / sister  Needs :  Serial debridement- prn     Good local wound care  Dressing: D./C  betadine wet to dry; STYART medihoney gel  Frequency : once weekly  2 layer calamine     Patient/  understood and agrees with plan. Questions answered.       24 1502    Right Leg Edema Point of Measurement   Compression Therapy 2 layer compression wrap   Wound 23 Foot Right;Anterior #2   Date First Assessed/Time First Assessed: 23 1427   Present on Original Admission: Yes  Wound Approximate Age at First Assessment (Weeks): 40 weeks  Location: Foot  Wound Location Orientation: Right;Anterior  Wound Description (Comments): #2   Dressing/Treatment    (medihoney gel, gauze, two layer compression wrap)   Offloading for Diabetic Foot Ulcers Offloading not required         Review of Systems   Constitutional:         No weeping or drainage from the right foot and ankle wound, no other symptoms of concern   All other systems reviewed and are negative.      Objective:     Blood pressure 115/73, pulse (!) 102, temperature 98.6 °F (37 °C), temperature source Tympanic, resp. rate 18.    Temp (24hrs), Av.6 °F (37 °C), Min:98.6 °F (37 °C), Max:98.6 °F (37 °C)      Physical Exam  Vitals and nursing note reviewed. Exam conducted with a chaperone present.   Constitutional:       General: She is not in acute distress.     Appearance: Normal appearance. She is not

## 2024-08-21 ENCOUNTER — HOSPITAL ENCOUNTER (OUTPATIENT)
Facility: HOSPITAL | Age: 74
Discharge: HOME | End: 2024-08-21
Attending: FAMILY MEDICINE
Payer: MEDICARE

## 2024-08-21 VITALS
HEART RATE: 88 BPM | RESPIRATION RATE: 18 BRPM | DIASTOLIC BLOOD PRESSURE: 77 MMHG | TEMPERATURE: 98 F | SYSTOLIC BLOOD PRESSURE: 107 MMHG

## 2024-08-21 DIAGNOSIS — L97.312 ANKLE ULCER, RIGHT, WITH FAT LAYER EXPOSED (HCC): ICD-10-CM

## 2024-08-21 DIAGNOSIS — I87.2 VENOUS STASIS DERMATITIS OF BOTH LOWER EXTREMITIES: ICD-10-CM

## 2024-08-21 DIAGNOSIS — L97.512 RIGHT FOOT ULCER, WITH FAT LAYER EXPOSED (HCC): Primary | ICD-10-CM

## 2024-08-21 PROCEDURE — 87205 SMEAR GRAM STAIN: CPT

## 2024-08-21 PROCEDURE — 87070 CULTURE OTHR SPECIMN AEROBIC: CPT

## 2024-08-21 ASSESSMENT — PAIN DESCRIPTION - LOCATION: LOCATION: FOOT

## 2024-08-21 ASSESSMENT — PAIN DESCRIPTION - ORIENTATION: ORIENTATION: RIGHT

## 2024-08-21 ASSESSMENT — PAIN DESCRIPTION - DESCRIPTORS: DESCRIPTORS: ACHING;BURNING

## 2024-08-21 ASSESSMENT — PAIN SCALES - GENERAL: PAINLEVEL_OUTOF10: 2

## 2024-08-21 NOTE — FLOWSHEET NOTE
08/21/24 1630   Left Leg Edema Point of Measurement   Compression Therapy Tubular elastic support bandage   Tubular Elastic Support Bandage Compression Pressure Low   Wound 08/21/24 Ankle Anterior;Right;Medial   Date First Assessed/Time First Assessed: 08/21/24 1434   Present on Original Admission: Yes  Wound Approximate Age at First Assessment (Weeks): 12 weeks  Primary Wound Type: Venous Ulcer  Location: Ankle  Wound Location Orientation: Anterior;Right;Medial   Dressing Status New dressing applied   Dressing/Treatment   (iodosorb, border foam)   Offloading for Diabetic Foot Ulcers Offloading not required   Wound 08/21/24 Foot Dorsal;Right   Date First Assessed/Time First Assessed: 08/21/24 1436   Present on Original Admission: Yes  Wound Approximate Age at First Assessment (Weeks): 24 weeks  Primary Wound Type: Venous Ulcer  Location: Foot  Wound Location Orientation: Dorsal;Right   Dressing Status New dressing applied   Dressing/Treatment   (border foam)   Offloading for Diabetic Foot Ulcers Offloading not required       
Assessment Hyperpigmented   Margins Defined edges   Wound Thickness Description not for Pressure Injury Partial thickness   Pain Assessment   Pain Assessment 0-10   Pain Level 2   Patient's Stated Pain Goal 0 - No pain   Pain Location Foot   Pain Orientation Right   Pain Descriptors Aching;Burning     /77   Pulse 88   Temp 98 °F (36.7 °C) (Temporal)   Resp 18

## 2024-08-21 NOTE — PROGRESS NOTES
Wound Center  Progress Note / Procedure Note      Chief Complaint:  Chanelle Clay is a 73 y.o. female  with Right foot and ankle wound since February 2023      Assessment/Plan     73 y.o. female with     -R ankle anterior chronic ulcer.  Full thickness  Remains healed    -R foot anterior chronic ulcer.  Full thickness  smaller, filling in  Slough/granular  Debrided as below      Leg swelling  Elevate  compression    -Nicotine dependence  Effects of nicotine on wound healing- aware      -Vascular  Normal ABIs/TBIs/ in April      Following discussed with patient / sister  Needs :  Serial debridement- prn    Good local wound care  Dressing: D./C  betadine wet to dry; STYART medihoney gel  Frequency : once weekly  2 layer calamine    Patient/  understood and agrees with plan. Questions answered.    Follow up with me in 1 week    Subjective:   Since last visit  No new issues      HPI:     Wound since Feb   Acquired from having laser therapy on feet for pain  Dr Mancini's office did not think it was due to laser  Being treated there since then  Not healing so came here  Has tried collagen, iodine and some other products    Has been on 2 course of abx, last one 4 weeks ago    History/Chart/Medications reviewed    Current wound care:See flowsheet  Offloading wound: Yes  Appetite: good  Wound associated pain: See flowsheet  Diabetic: no  Smoker: yes  ROS: no N/V/D, no T/chills; no local rash, no chest pain or shortness of breath, no headache or dizzyness      Objective:     Physical Exam:   See flowsheet / nursing notes for vitals  Vitals:    08/21/24 1431   BP: 107/77   Pulse: 88   Resp: 18   Temp: 98 °F (36.7 °C)     General: NAD. Hygiene good  Psych: cooperative. No anxiety or depression. Normal mood and affect.  Neuro: alert and oriented to person/place/situation. Otherwise nonfocal.  Derm: Normal  turgor for age, dry skin  HEENT: Normocephalic, atraumatic. EOMI. Conjunctiva clear. No scleral icterus.  Neck: Normal

## 2024-08-21 NOTE — PATIENT INSTRUCTIONS
REACH US. IT IS MOST IMPORTANT TO KEEP THE WOUND COVERED AT ALL TIMES.     Physician Signature:_______________________    Date: ___________ Time:  ____________

## 2024-08-23 LAB
BACTERIA SPEC CULT: NORMAL
GRAM STN SPEC: NORMAL
SERVICE CMNT-IMP: NORMAL

## 2024-08-29 ENCOUNTER — TELEPHONE (OUTPATIENT)
Facility: HOSPITAL | Age: 74
End: 2024-08-29

## 2024-09-04 ENCOUNTER — HOSPITAL ENCOUNTER (OUTPATIENT)
Facility: HOSPITAL | Age: 74
Discharge: HOME | End: 2024-09-04
Attending: FAMILY MEDICINE
Payer: MEDICARE

## 2024-09-04 VITALS
DIASTOLIC BLOOD PRESSURE: 63 MMHG | TEMPERATURE: 98 F | RESPIRATION RATE: 18 BRPM | SYSTOLIC BLOOD PRESSURE: 106 MMHG | HEART RATE: 91 BPM

## 2024-09-04 PROCEDURE — 11042 DBRDMT SUBQ TIS 1ST 20SQCM/<: CPT

## 2024-09-04 RX ORDER — LIDOCAINE HYDROCHLORIDE 20 MG/ML
JELLY TOPICAL ONCE
OUTPATIENT
Start: 2024-09-04 | End: 2024-09-04

## 2024-09-04 RX ORDER — MUPIROCIN 20 MG/G
OINTMENT TOPICAL ONCE
OUTPATIENT
Start: 2024-09-04 | End: 2024-09-04

## 2024-09-04 RX ORDER — SODIUM CHLOR/HYPOCHLOROUS ACID 0.033 %
5 SOLUTION, IRRIGATION IRRIGATION DAILY
Qty: 250 ML | Refills: 1 | Status: SHIPPED | OUTPATIENT
Start: 2024-09-04 | End: 2024-10-04

## 2024-09-04 RX ORDER — GENTAMICIN SULFATE 1 MG/G
OINTMENT TOPICAL
Qty: 30 G | Refills: 1 | Status: SHIPPED | OUTPATIENT
Start: 2024-09-04 | End: 2024-10-02

## 2024-09-04 RX ORDER — LIDOCAINE HYDROCHLORIDE 40 MG/ML
SOLUTION TOPICAL ONCE
OUTPATIENT
Start: 2024-09-04 | End: 2024-09-04

## 2024-09-04 RX ORDER — BETAMETHASONE DIPROPIONATE 0.5 MG/G
CREAM TOPICAL ONCE
OUTPATIENT
Start: 2024-09-04 | End: 2024-09-04

## 2024-09-04 RX ORDER — NEOMYCIN/BACITRACIN/POLYMYXINB 3.5-400-5K
OINTMENT (GRAM) TOPICAL ONCE
OUTPATIENT
Start: 2024-09-04 | End: 2024-09-04

## 2024-09-04 RX ORDER — SODIUM CHLOR/HYPOCHLOROUS ACID 0.033 %
SOLUTION, IRRIGATION IRRIGATION ONCE
OUTPATIENT
Start: 2024-09-04 | End: 2024-09-04

## 2024-09-04 RX ORDER — BACITRACIN ZINC AND POLYMYXIN B SULFATE 500; 1000 [USP'U]/G; [USP'U]/G
OINTMENT TOPICAL ONCE
OUTPATIENT
Start: 2024-09-04 | End: 2024-09-04

## 2024-09-04 RX ORDER — GINSENG 100 MG
CAPSULE ORAL ONCE
OUTPATIENT
Start: 2024-09-04 | End: 2024-09-04

## 2024-09-04 RX ORDER — GENTAMICIN SULFATE 1 MG/G
OINTMENT TOPICAL ONCE
OUTPATIENT
Start: 2024-09-04 | End: 2024-09-04

## 2024-09-04 RX ORDER — CLOBETASOL PROPIONATE 0.5 MG/G
OINTMENT TOPICAL ONCE
OUTPATIENT
Start: 2024-09-04 | End: 2024-09-04

## 2024-09-04 RX ORDER — LIDOCAINE 40 MG/G
CREAM TOPICAL ONCE
OUTPATIENT
Start: 2024-09-04 | End: 2024-09-04

## 2024-09-04 RX ORDER — TRIAMCINOLONE ACETONIDE 1 MG/G
OINTMENT TOPICAL ONCE
OUTPATIENT
Start: 2024-09-04 | End: 2024-09-04

## 2024-09-04 RX ORDER — LIDOCAINE 50 MG/G
OINTMENT TOPICAL ONCE
OUTPATIENT
Start: 2024-09-04 | End: 2024-09-04

## 2024-09-04 ASSESSMENT — PAIN SCALES - GENERAL: PAINLEVEL_OUTOF10: 2

## 2024-09-04 ASSESSMENT — PAIN DESCRIPTION - DESCRIPTORS: DESCRIPTORS: ACHING;BURNING

## 2024-09-04 ASSESSMENT — PAIN DESCRIPTION - ORIENTATION: ORIENTATION: RIGHT

## 2024-09-04 ASSESSMENT — PAIN DESCRIPTION - LOCATION: LOCATION: FOOT

## 2024-09-04 NOTE — FLOWSHEET NOTE
09/04/24 1357   Anesthetic   Anesthetic 4% Lidocaine Liquid Topical   Right Leg Edema Point of Measurement   Leg circumference 38 cm   Ankle circumference 19 cm   Left Leg Edema Point of Measurement   Compression Therapy Tubular elastic support bandage   Tubular Elastic Support Bandage Compression Pressure Low   Peripheral Vascular   RLE Edema Trace   RLE Neurovascular Assessment   Capillary Refill Less than/Equal to 3 seconds   Color Yellow-Brown/Hemosiderin Staining   Temperature Warm   RLE Sensation  Full sensation   R Pedal Pulse +2   Wound 08/21/24 Ankle Anterior;Right;Medial   Date First Assessed/Time First Assessed: 08/21/24 1434   Present on Original Admission: Yes  Wound Approximate Age at First Assessment (Weeks): 12 weeks  Primary Wound Type: Venous Ulcer  Location: Ankle  Wound Location Orientation: Anterior;Right;Medial   Wound Image    Wound Etiology Venous   Dressing Status Old drainage noted   Wound Cleansed Cleansed with saline   Offloading for Diabetic Foot Ulcers Offloading not required   Wound Length (cm) 1 cm   Wound Width (cm) 0.3 cm   Wound Depth (cm) 0.2 cm   Wound Surface Area (cm^2) 0.3 cm^2   Change in Wound Size % (l*w) 50   Wound Volume (cm^3) 0.06 cm^3   Wound Healing % 50   Wound Assessment [place]/red;Slough   Drainage Amount Moderate (25-50%)   Drainage Description Serosanguinous   Odor None   Mirian-wound Assessment Hyperpigmented   Margins Defined edges;Epibole (rolled edges)   Wound Thickness Description not for Pressure Injury Full thickness   Wound 08/21/24 Foot Dorsal;Right   Date First Assessed/Time First Assessed: 08/21/24 1436   Present on Original Admission: Yes  Wound Approximate Age at First Assessment (Weeks): 24 weeks  Primary Wound Type: Venous Ulcer  Location: Foot  Wound Location Orientation: Dorsal;Right   Wound Image    Wound Etiology Venous   Dressing Status Intact   Wound Cleansed Cleansed with saline   Offloading for Diabetic Foot Ulcers Offloading not required

## 2024-09-04 NOTE — PROGRESS NOTES
Wound Center  Progress Note / Procedure Note      Chief Complaint:  Chanelle Clay is a 73 y.o. female  with Right foot and ankle wound since February 2023      Assessment/Plan     73 y.o. female with     -R ankle anterior chronic ulcer.  Re-opened  Tender  Full thickness  Slough/granular  Necessitates debridement  for wound healing and to prevent/heal infection  Ulcer needs debridement- see note below    Culture reviewed        -R foot anterior chronic ulcer.  Re-opened   tender  Periwound discolored  Full thickness  Slough/granular  Necessitates debridement  for wound healing and to prevent/heal infection  Ulcer needs debridement- see note below      Left lateral ankle  Mild DTI  No open areas  Offload      Leg swelling  Elevate  Compression  R/o venous insufficiency   venous reflux test normal  Discussed in detail      -Nicotine dependence  Aware of Effects of nicotine on wound healing-       -Vascular  Normal ABIs/TBIs/ in April 2023          Following discussed with patient / sister  Needs :  Serial debridement- prn    Good local wound care  Wash with Vashe daily  Dressing: D/C  iodosorb; START gentamicin  Frequency : daily      Continue Home Health    Patient/  understood and agrees with plan. Questions answered.    Follow up  in 2 week; consider skin biopsy next few weeks, once it gets more granular        Subjective:   Since last visit  9/4  HH started, only been out once      8/21  Patient returns today for same wounds  Last seen 3/6 when wound s had healed  Opened up few weeks ago  Very tender    Has been wearing compression socks daily    Also c/o ankle pain on left- lays on that side        HPI:     Wound since Feb 2023  Acquired from having laser therapy on feet for pain  Dr Mancini's office did not think it was due to laser  Being treated there since then  Not healing so came here  Has tried collagen, iodine and some other products    Has been on 2 course of abx, last one 4 weeks

## 2024-09-04 NOTE — PATIENT INSTRUCTIONS
Discharge Instructions for  [facility]  [address] [address]  Phone: [phone] Fax: [phone]    NAME:  Chanelle Clay  YOB: 1950  MEDICAL RECORD NUMBER:  [id number]  DATE:  September 4, 2024    WOUND CARE ORDERS:   Right ankle and left foot wounds- Cleanse with Vashe. Allow to sit for 5-7 minutes. Pat dry. Apply gentamicin ointment to wound. Cover with gauze. Secure with roll gauze and tape. Change daily.  Heelz Up cushion  Home Health ([facility]) - change dressings three times a week.    Activity:  [x] Elevate leg(s) above the level of the heart when sitting.  [x] Avoid prolonged standing in one place.   [x] Do no get dressing/wrap wet.       Dietary:  [x] Diet as tolerated      [] Diabetic Diet            [x] Increase Protein: examples (Meat, cheese, eggs, greek yogurt, fish, nuts)          [] Дмитрий Therapeutic Nutrition Powder  [] Other:  [] Dial a Dietician : Call [employer] at [phone] enter code ([email or web address]) when prompted. M-F 9am-5pm EST.      Return Appointment:  [x] Return Appointment: With Dr. Eli Gill in 2 Week(s)  [] Nurse Visit   [] Ordered tests:      Electronically signed Anjana Sy RN on 9/4/2024 at 2:53 PM     Wound Care Center Information: Should you experience any significant changes in your wound(s) or have questions about your wound care, please contact the [facility] at MONDAY - FRIDAY 8:00 am - 4:30.  If you need help with your wound outside these hours and cannot wait until we are again available, contact your PCP or go to the hospital emergency room.     PLEASE NOTE: IF YOU ARE UNABLE TO OBTAIN WOUND SUPPLIES, CONTINUE TO USE THE SUPPLIES YOU HAVE AVAILABLE UNTIL YOU ARE ABLE TO REACH US. IT IS MOST IMPORTANT TO KEEP THE WOUND COVERED AT ALL TIMES.     Physician Signature:_______________________    Date: ___________ Time:  ____________

## 2024-09-04 NOTE — FLOWSHEET NOTE
09/04/24 1455   Wound 08/21/24 Ankle Anterior;Right;Medial   Date First Assessed/Time First Assessed: 08/21/24 1434   Present on Original Admission: Yes  Wound Approximate Age at First Assessment (Weeks): 12 weeks  Primary Wound Type: Venous Ulcer  Location: Ankle  Wound Location Orientation: Anterior;Right;Medial   Dressing Status New dressing applied   Wound Cleansed Vashe   Dressing/Treatment Gauze dressing/dressing sponge;Roll gauze;Tape/Soft cloth adhesive tape  (gentamycin)   Offloading for Diabetic Foot Ulcers Offloading not required   Wound 08/21/24 Foot Dorsal;Right   Date First Assessed/Time First Assessed: 08/21/24 1436   Present on Original Admission: Yes  Wound Approximate Age at First Assessment (Weeks): 24 weeks  Primary Wound Type: Venous Ulcer  Location: Foot  Wound Location Orientation: Dorsal;Right   Dressing Status New dressing applied   Wound Cleansed Vashe   Dressing/Treatment Gauze dressing/dressing sponge;Roll gauze;Tape/Soft cloth adhesive tape  (gentamycin)

## 2024-09-18 ENCOUNTER — HOSPITAL ENCOUNTER (OUTPATIENT)
Facility: HOSPITAL | Age: 74
Discharge: HOME | End: 2024-09-18
Attending: FAMILY MEDICINE
Payer: MEDICARE

## 2024-09-18 VITALS
SYSTOLIC BLOOD PRESSURE: 113 MMHG | DIASTOLIC BLOOD PRESSURE: 71 MMHG | TEMPERATURE: 98.2 F | HEART RATE: 98 BPM | RESPIRATION RATE: 16 BRPM

## 2024-09-18 DIAGNOSIS — L97.312 ANKLE ULCER, RIGHT, WITH FAT LAYER EXPOSED (HCC): Primary | ICD-10-CM

## 2024-09-18 DIAGNOSIS — L97.512 RIGHT FOOT ULCER, WITH FAT LAYER EXPOSED (HCC): ICD-10-CM

## 2024-09-18 PROCEDURE — 11042 DBRDMT SUBQ TIS 1ST 20SQCM/<: CPT

## 2024-09-18 RX ORDER — BACITRACIN ZINC AND POLYMYXIN B SULFATE 500; 1000 [USP'U]/G; [USP'U]/G
OINTMENT TOPICAL ONCE
OUTPATIENT
Start: 2024-09-18 | End: 2024-09-18

## 2024-09-18 RX ORDER — BETAMETHASONE DIPROPIONATE 0.5 MG/G
CREAM TOPICAL ONCE
OUTPATIENT
Start: 2024-09-18 | End: 2024-09-18

## 2024-09-18 RX ORDER — GINSENG 100 MG
CAPSULE ORAL ONCE
OUTPATIENT
Start: 2024-09-18 | End: 2024-09-18

## 2024-09-18 RX ORDER — LIDOCAINE HYDROCHLORIDE 40 MG/ML
SOLUTION TOPICAL ONCE
OUTPATIENT
Start: 2024-09-18 | End: 2024-09-18

## 2024-09-18 RX ORDER — GENTAMICIN SULFATE 1 MG/G
OINTMENT TOPICAL ONCE
OUTPATIENT
Start: 2024-09-18 | End: 2024-09-18

## 2024-09-18 RX ORDER — LIDOCAINE HYDROCHLORIDE 20 MG/ML
JELLY TOPICAL ONCE
OUTPATIENT
Start: 2024-09-18 | End: 2024-09-18

## 2024-09-18 RX ORDER — NEOMYCIN/BACITRACIN/POLYMYXINB 3.5-400-5K
OINTMENT (GRAM) TOPICAL ONCE
OUTPATIENT
Start: 2024-09-18 | End: 2024-09-18

## 2024-09-18 RX ORDER — TRIAMCINOLONE ACETONIDE 1 MG/G
OINTMENT TOPICAL ONCE
OUTPATIENT
Start: 2024-09-18 | End: 2024-09-18

## 2024-09-18 RX ORDER — MUPIROCIN 20 MG/G
OINTMENT TOPICAL ONCE
OUTPATIENT
Start: 2024-09-18 | End: 2024-09-18

## 2024-09-18 RX ORDER — LIDOCAINE 40 MG/G
CREAM TOPICAL ONCE
OUTPATIENT
Start: 2024-09-18 | End: 2024-09-18

## 2024-09-18 RX ORDER — LIDOCAINE 50 MG/G
OINTMENT TOPICAL ONCE
OUTPATIENT
Start: 2024-09-18 | End: 2024-09-18

## 2024-09-18 RX ORDER — CLOBETASOL PROPIONATE 0.5 MG/G
OINTMENT TOPICAL ONCE
OUTPATIENT
Start: 2024-09-18 | End: 2024-09-18

## 2024-09-18 RX ORDER — SODIUM CHLOR/HYPOCHLOROUS ACID 0.033 %
SOLUTION, IRRIGATION IRRIGATION ONCE
OUTPATIENT
Start: 2024-09-18 | End: 2024-09-18

## 2024-10-02 ENCOUNTER — HOSPITAL ENCOUNTER (OUTPATIENT)
Facility: HOSPITAL | Age: 74
Discharge: HOME | End: 2024-10-02
Attending: FAMILY MEDICINE
Payer: MEDICARE

## 2024-10-02 VITALS — TEMPERATURE: 98.9 F | SYSTOLIC BLOOD PRESSURE: 102 MMHG | HEART RATE: 89 BPM | DIASTOLIC BLOOD PRESSURE: 61 MMHG

## 2024-10-02 DIAGNOSIS — L97.512 RIGHT FOOT ULCER, WITH FAT LAYER EXPOSED (HCC): ICD-10-CM

## 2024-10-02 DIAGNOSIS — L97.312 ANKLE ULCER, RIGHT, WITH FAT LAYER EXPOSED (HCC): Primary | ICD-10-CM

## 2024-10-02 PROCEDURE — 11042 DBRDMT SUBQ TIS 1ST 20SQCM/<: CPT

## 2024-10-02 RX ORDER — BETAMETHASONE DIPROPIONATE 0.5 MG/G
CREAM TOPICAL ONCE
OUTPATIENT
Start: 2024-10-02 | End: 2024-10-02

## 2024-10-02 RX ORDER — SODIUM CHLOR/HYPOCHLOROUS ACID 0.033 %
SOLUTION, IRRIGATION IRRIGATION ONCE
OUTPATIENT
Start: 2024-10-02 | End: 2024-10-02

## 2024-10-02 RX ORDER — GENTAMICIN SULFATE 1 MG/G
OINTMENT TOPICAL ONCE
Status: COMPLETED | OUTPATIENT
Start: 2024-10-02 | End: 2024-10-02

## 2024-10-02 RX ORDER — CLOBETASOL PROPIONATE 0.5 MG/G
OINTMENT TOPICAL ONCE
OUTPATIENT
Start: 2024-10-02 | End: 2024-10-02

## 2024-10-02 RX ORDER — LIDOCAINE HYDROCHLORIDE 20 MG/ML
JELLY TOPICAL ONCE
OUTPATIENT
Start: 2024-10-02 | End: 2024-10-02

## 2024-10-02 RX ORDER — TRIAMCINOLONE ACETONIDE 1 MG/G
OINTMENT TOPICAL ONCE
OUTPATIENT
Start: 2024-10-02 | End: 2024-10-02

## 2024-10-02 RX ORDER — BACITRACIN ZINC AND POLYMYXIN B SULFATE 500; 1000 [USP'U]/G; [USP'U]/G
OINTMENT TOPICAL ONCE
OUTPATIENT
Start: 2024-10-02 | End: 2024-10-02

## 2024-10-02 RX ORDER — LIDOCAINE 50 MG/G
OINTMENT TOPICAL ONCE
OUTPATIENT
Start: 2024-10-02 | End: 2024-10-02

## 2024-10-02 RX ORDER — LIDOCAINE HYDROCHLORIDE 40 MG/ML
SOLUTION TOPICAL ONCE
OUTPATIENT
Start: 2024-10-02 | End: 2024-10-02

## 2024-10-02 RX ORDER — GENTAMICIN SULFATE 1 MG/G
OINTMENT TOPICAL ONCE
OUTPATIENT
Start: 2024-10-02 | End: 2024-10-02

## 2024-10-02 RX ORDER — NEOMYCIN/BACITRACIN/POLYMYXINB 3.5-400-5K
OINTMENT (GRAM) TOPICAL ONCE
OUTPATIENT
Start: 2024-10-02 | End: 2024-10-02

## 2024-10-02 RX ORDER — LIDOCAINE 40 MG/G
CREAM TOPICAL ONCE
OUTPATIENT
Start: 2024-10-02 | End: 2024-10-02

## 2024-10-02 RX ORDER — GINSENG 100 MG
CAPSULE ORAL ONCE
OUTPATIENT
Start: 2024-10-02 | End: 2024-10-02

## 2024-10-02 RX ORDER — MUPIROCIN 20 MG/G
OINTMENT TOPICAL ONCE
OUTPATIENT
Start: 2024-10-02 | End: 2024-10-02

## 2024-10-02 RX ADMIN — GENTAMICIN SULFATE: 1 OINTMENT TOPICAL at 14:40

## 2024-10-02 NOTE — PROGRESS NOTES
ENDOSCOPY    HYSTERECTOMY (CERVIX STATUS UNKNOWN)      ORTHOPEDIC SURGERY      carpal tunnel      History reviewed. No pertinent family history.  Social History     Socioeconomic History    Marital status: Single     Spouse name: None    Number of children: None    Years of education: None    Highest education level: None   Tobacco Use    Smoking status: Every Day     Current packs/day: 0.50     Types: Cigarettes    Smokeless tobacco: Never   Substance and Sexual Activity    Alcohol use: Yes     Alcohol/week: 4.0 standard drinks of alcohol    Drug use: Not Currently        Prior to Admission medications    Medication Sig Start Date End Date Taking? Authorizing Provider   gentamicin (GARAMYCIN) 0.1 % ointment Apply topically with each wound dressing changes 9/4/24 10/2/24  Eli Gill MD   Wound Cleansers (VASHE CLEANSING) SOLN Apply 5 mLs topically daily Apply to gauze then leave gauze on wound for 5 min.  Every day with dressing change. No substitution 9/4/24 10/4/24  Eli Gill MD   Cholecalciferol (VITAMIN D3) 125 MCG (5000 UT) TABS Take 1 tablet by mouth    Franky Carrillo MD   Cyanocobalamin (B-12) 3000 MCG CAPS Take 1 Dose by mouth    Franky Carrillo MD   albuterol sulfate HFA (VENTOLIN HFA) 108 (90 Base) MCG/ACT inhaler Inhale 2 puffs into the lungs every 6 hours as needed for Wheezing    ProviderFranky MD   Biotin 5000 MCG CAPS Take 1 Dose by mouth    Franky Carrillo MD   cetirizine (ZYRTEC) 10 MG tablet Take 1 tablet by mouth daily    Automatic Reconciliation, Ar   rosuvastatin (CRESTOR) 20 MG tablet Take 1 tablet by mouth nightly    Automatic Reconciliation, Ar   thyroid (ARMOUR) 30 MG tablet Take 1 tablet by mouth daily    Automatic Reconciliation, Ar      Allergies   Allergen Reactions    Morphine Other (See Comments)     \"whacko\"    Sulfa Antibiotics Itching     Nervous, anxious    Penicillins Rash     Hives          Signed By: lEi Gill MD      10/02/24

## 2024-10-02 NOTE — PATIENT INSTRUCTIONS
Discharge Instructions for  [facility]  [address] [address]  Phone: [phone] Fax: [phone]    NAME:  Chanelle Clay  YOB: 1950  MEDICAL RECORD NUMBER:  [id number]  DATE:  October 2, 2024    WOUND CARE ORDERS:   Right ankle and left foot wounds- Cleanse with Vashe. Allow to sit for 5-7 minutes. Pat dry. Apply gentamicin ointment to wound. At home we would like you to use BlastX. Cover with gauze. Secure with mepilex or silicone border. Change at least 5 times a week.   Heelz Up cushion  Home Health ([facility]) - change dressings three times a week. Please order BlastX.     Activity:  [x] Elevate leg(s) above the level of the heart when sitting.  [x] Avoid prolonged standing in one place.   [x] Do no get dressing/wrap wet.       Dietary:  [x] Diet as tolerated      [] Diabetic Diet            [x] Increase Protein: examples (Meat, cheese, eggs, greek yogurt, fish, nuts)          [] Дмитрий Therapeutic Nutrition Powder  [] Other:  [] Dial a Dietician : Call [employer] at [phone] enter code (249) when prompted. M-F 9am-5pm EST.      Return Appointment:  [x] Return Appointment: With Dr. Sharma in 1 Week  [] Nurse Visit   [] Ordered tests:     Wound Care Center Information: Should you experience any significant changes in your wound(s) or have questions about your wound care, please contact the [facility] at MONDAY - FRIDAY 8:00 am - 4:30.  If you need help with your wound outside these hours and cannot wait until we are again available, contact your PCP or go to the hospital emergency room.     PLEASE NOTE: IF YOU ARE UNABLE TO OBTAIN WOUND SUPPLIES, CONTINUE TO USE THE SUPPLIES YOU HAVE AVAILABLE UNTIL YOU ARE ABLE TO REACH US. IT IS MOST IMPORTANT TO KEEP THE WOUND COVERED AT ALL TIMES.     Physician Signature:_______________________    Date: ___________ Time:  ____________

## 2024-10-02 NOTE — FLOWSHEET NOTE
10/02/24 1443   Wound 08/21/24 Ankle Anterior;Right;Medial   Date First Assessed/Time First Assessed: 08/21/24 1434   Present on Original Admission: Yes  Wound Approximate Age at First Assessment (Weeks): 12 weeks  Primary Wound Type: Venous Ulcer  Location: Ankle  Wound Location Orientation: Anterior;Right;Medial   Dressing Status New dressing applied   Wound Cleansed Vashe   Dressing/Treatment   (gentamicin, border foam)   Offloading for Diabetic Foot Ulcers Offloading not required   Wound 08/21/24 Foot Dorsal;Right   Date First Assessed/Time First Assessed: 08/21/24 1436   Present on Original Admission: Yes  Wound Approximate Age at First Assessment (Weeks): 24 weeks  Primary Wound Type: Venous Ulcer  Location: Foot  Wound Location Orientation: Dorsal;Right   Dressing Status New dressing applied   Wound Cleansed Vashe   Dressing/Treatment   (gentamicin, border foam)   Offloading for Diabetic Foot Ulcers Offloading not required       
Blanchable erythema   Margins Defined edges   Wound Thickness Description not for Pressure Injury Full thickness     /61   Pulse 89   Temp 98.9 °F (37.2 °C) (Temporal)

## 2024-10-15 ENCOUNTER — HOSPITAL ENCOUNTER (OUTPATIENT)
Facility: HOSPITAL | Age: 74
Discharge: HOME | End: 2024-10-15
Attending: FAMILY MEDICINE
Payer: MEDICARE

## 2024-10-15 VITALS
DIASTOLIC BLOOD PRESSURE: 74 MMHG | TEMPERATURE: 98.6 F | RESPIRATION RATE: 18 BRPM | SYSTOLIC BLOOD PRESSURE: 117 MMHG | HEART RATE: 80 BPM

## 2024-10-15 DIAGNOSIS — L97.512 RIGHT FOOT ULCER, WITH FAT LAYER EXPOSED (HCC): ICD-10-CM

## 2024-10-15 DIAGNOSIS — L97.312 ANKLE ULCER, RIGHT, WITH FAT LAYER EXPOSED (HCC): Primary | ICD-10-CM

## 2024-10-15 PROCEDURE — 11042 DBRDMT SUBQ TIS 1ST 20SQCM/<: CPT

## 2024-10-15 RX ORDER — BACITRACIN ZINC AND POLYMYXIN B SULFATE 500; 1000 [USP'U]/G; [USP'U]/G
OINTMENT TOPICAL ONCE
OUTPATIENT
Start: 2024-10-15 | End: 2024-10-15

## 2024-10-15 RX ORDER — LIDOCAINE HYDROCHLORIDE 20 MG/ML
JELLY TOPICAL ONCE
OUTPATIENT
Start: 2024-10-15 | End: 2024-10-15

## 2024-10-15 RX ORDER — LIDOCAINE HYDROCHLORIDE 40 MG/ML
SOLUTION TOPICAL ONCE
OUTPATIENT
Start: 2024-10-15 | End: 2024-10-15

## 2024-10-15 RX ORDER — GINSENG 100 MG
CAPSULE ORAL ONCE
OUTPATIENT
Start: 2024-10-15 | End: 2024-10-15

## 2024-10-15 RX ORDER — BETAMETHASONE DIPROPIONATE 0.5 MG/G
CREAM TOPICAL ONCE
OUTPATIENT
Start: 2024-10-15 | End: 2024-10-15

## 2024-10-15 RX ORDER — MUPIROCIN 20 MG/G
OINTMENT TOPICAL ONCE
OUTPATIENT
Start: 2024-10-15 | End: 2024-10-15

## 2024-10-15 RX ORDER — NEOMYCIN/BACITRACIN/POLYMYXINB 3.5-400-5K
OINTMENT (GRAM) TOPICAL ONCE
OUTPATIENT
Start: 2024-10-15 | End: 2024-10-15

## 2024-10-15 RX ORDER — TRIAMCINOLONE ACETONIDE 1 MG/G
OINTMENT TOPICAL ONCE
OUTPATIENT
Start: 2024-10-15 | End: 2024-10-15

## 2024-10-15 RX ORDER — LIDOCAINE 50 MG/G
OINTMENT TOPICAL ONCE
OUTPATIENT
Start: 2024-10-15 | End: 2024-10-15

## 2024-10-15 RX ORDER — SODIUM CHLOR/HYPOCHLOROUS ACID 0.033 %
SOLUTION, IRRIGATION IRRIGATION ONCE
OUTPATIENT
Start: 2024-10-15 | End: 2024-10-15

## 2024-10-15 RX ORDER — CLOBETASOL PROPIONATE 0.5 MG/G
OINTMENT TOPICAL ONCE
OUTPATIENT
Start: 2024-10-15 | End: 2024-10-15

## 2024-10-15 RX ORDER — LIDOCAINE HYDROCHLORIDE 40 MG/ML
SOLUTION TOPICAL ONCE
Status: DISCONTINUED | OUTPATIENT
Start: 2024-10-15 | End: 2024-10-16 | Stop reason: HOSPADM

## 2024-10-15 RX ORDER — GENTAMICIN SULFATE 1 MG/G
OINTMENT TOPICAL ONCE
OUTPATIENT
Start: 2024-10-15 | End: 2024-10-15

## 2024-10-15 RX ORDER — LIDOCAINE 40 MG/G
CREAM TOPICAL ONCE
OUTPATIENT
Start: 2024-10-15 | End: 2024-10-15

## 2024-10-15 ASSESSMENT — PAIN DESCRIPTION - LOCATION: LOCATION: FOOT;LEG

## 2024-10-15 ASSESSMENT — PAIN DESCRIPTION - FREQUENCY: FREQUENCY: CONTINUOUS

## 2024-10-15 ASSESSMENT — PAIN SCALES - GENERAL: PAINLEVEL_OUTOF10: 4

## 2024-10-15 ASSESSMENT — PAIN DESCRIPTION - PAIN TYPE: TYPE: CHRONIC PAIN

## 2024-10-15 NOTE — FLOWSHEET NOTE
10/15/24 1548   Wound 08/21/24 Ankle Anterior;Right;Lateral   Date First Assessed/Time First Assessed: 08/21/24 1434   Present on Original Admission: Yes  Wound Approximate Age at First Assessment (Weeks): 12 weeks  Primary Wound Type: Venous Ulcer  Location: Ankle  Wound Location Orientation: Anterior;Right;Lat...   Wound Image    Wound Etiology Venous   Dressing Status Old drainage noted   Wound Cleansed Cleansed with saline   Wound Length (cm) 1 cm   Wound Width (cm) 0.6 cm   Wound Depth (cm) 0 cm   Wound Surface Area (cm^2) 0.6 cm^2   Change in Wound Size % (l*w) 0   Wound Volume (cm^3) 0 cm^3   Wound Healing % 100   Wound Assessment Pink/red   Drainage Amount Small (< 25%)   Drainage Description Serous   Odor None   Mirian-wound Assessment Blanchable erythema   Margins Defined edges   Wound Thickness Description not for Pressure Injury Full thickness   Wound 08/21/24 Foot Dorsal;Right   Date First Assessed/Time First Assessed: 08/21/24 1436   Present on Original Admission: Yes  Wound Approximate Age at First Assessment (Weeks): 24 weeks  Primary Wound Type: Venous Ulcer  Location: Foot  Wound Location Orientation: Dorsal;Right   Wound Image    Wound Etiology Venous   Dressing Status Old drainage noted   Wound Length (cm) 0.6 cm   Wound Width (cm) 0.6 cm   Wound Depth (cm) 0.2 cm   Wound Surface Area (cm^2) 0.36 cm^2   Change in Wound Size % (l*w) -200   Wound Volume (cm^3) 0.072 cm^3   Wound Healing % -500   Wound Assessment Pink/red   Drainage Amount Small (< 25%)   Drainage Description Serous   Odor None   Mirian-wound Assessment Hemosiderin staining (brown yellow)   Margins Defined edges   Wound Thickness Description not for Pressure Injury Full thickness     /74   Pulse 80   Temp 98.6 °F (37 °C) (Temporal)   Resp 18

## 2024-10-15 NOTE — PATIENT INSTRUCTIONS
Discharge Instructions for  [facility]  [address] [address]  Phone: [phone] Fax: [phone]    NAME:  Chanelle Clay  YOB: 1950  MEDICAL RECORD NUMBER:  [id number]  DATE:  October 15, 2024    WOUND CARE ORDERS:   Right foot wounds- Cleanse with Vashe. Allow to sit for 5-7 minutes. Pat dry. Apply BlastX to wound bed. Cover with gauze. Secure with mepilex or silicone border. Change at least 5 times a week.     Heelz Up cushion    Home Health ([facility]) - change dressings three times a week.       Activity:  [x] Elevate leg(s) above the level of the heart when sitting.  [x] Avoid prolonged standing in one place.   [x] Do no get dressing/wrap wet.       Dietary:  [x] Diet as tolerated      [] Diabetic Diet            [x] Increase Protein: examples (Meat, cheese, eggs, greek yogurt, fish, nuts)          [] Дмитрий Therapeutic Nutrition Powder  [] Other:       Return Appointment:  [x] Return Appointment: With Dr. Gill in 1 Week  [] Nurse Visit   [] Ordered tests:     Wound Care Center Information: Should you experience any significant changes in your wound(s) or have questions about your wound care, please contact the [facility] at MONDAY - FRIDAY 8:00 am - 4:30.  If you need help with your wound outside these hours and cannot wait until we are again available, contact your PCP or go to the hospital emergency room.     PLEASE NOTE: IF YOU ARE UNABLE TO OBTAIN WOUND SUPPLIES, CONTINUE TO USE THE SUPPLIES YOU HAVE AVAILABLE UNTIL YOU ARE ABLE TO REACH US. IT IS MOST IMPORTANT TO KEEP THE WOUND COVERED AT ALL TIMES.     Physician Signature:_______________________    Date: ___________ Time:  ____________

## 2024-10-15 NOTE — PROGRESS NOTES
cm 10/02/24 1439   Post-Procedure Depth (cm) 0.3 cm 10/02/24 1439   Post-Procedure Surface Area (cm^2) 0.1 cm^2 10/02/24 1439   Post-Procedure Volume (cm^3) 0.03 cm^3 10/02/24 1439   Wound Assessment Pink/red 10/15/24 1548   Drainage Amount Small (< 25%) 10/15/24 1548   Drainage Description Serous 10/15/24 1548   Odor None 10/15/24 1548   Mirian-wound Assessment Hemosiderin staining (brown yellow) 10/15/24 1548   Margins Defined edges 10/15/24 1548   Wound Thickness Description not for Pressure Injury Full thickness 10/15/24 1548   Number of days: 55               I have noted, and reviewed today's data for this patient in [facility] and concur with same. The focused physical exam, other physical findings, Medical history, Review of Symptoms and Medications today remains unchanged except as noted below.     Patient notes today: open wound X2 years here for second opinion to consider options for wound care. Note 1ppd smoker for most of life time. Here today with sister.  Lesion/Wound, focused exam on Presentation today: RLE anterior pretib distal, just above ankle open ulcer X2 pink border with brown surround of old wound healing pattern. Thin slough over open surface. .    Procedure:   Ulcer Type: arterial and undetermined  Consent obtained: Yes  Time out taken: Yes    Wound,(s)# RLE.  Procedure name: sharp excisional debridement.  Anaesthesia: Lidocaine; topical    Description: using a sharp curette I excised all non viable tissue to effect a clean bleeding base. Then applied 2 gtts of Timolol with vessel dilation noted. .  Tissue Level/depth of debridement: subq.  Post debridement dimensions changed as noted:    Depth, add 1.0 mm;    Width, add 0.0 mm   Length, add 0.0 mm.   Blood Loss: 1 CCs. Bleeding abated post treatment .   Post Procedure Condition/ Diagnosis: actually seems to now be improving, I do think the nicotine addiction contributes to the wound, also low in the dependant limb. Dr. Gill has ordered to

## 2024-10-15 NOTE — FLOWSHEET NOTE
10/15/24 1630   Wound 08/21/24 Ankle Anterior;Right;Lateral   Date First Assessed/Time First Assessed: 08/21/24 1434   Present on Original Admission: Yes  Wound Approximate Age at First Assessment (Weeks): 12 weeks  Primary Wound Type: Venous Ulcer  Location: Ankle  Wound Location Orientation: Anterior;Right;Lat...   Dressing Status New dressing applied   Wound Cleansed Vashe   Dressing/Treatment Other (comment)  (BlastX to wound bed; Gauze; Mepilex or Silicone border)   Offloading for Diabetic Foot Ulcers Offloading not required   Wound 08/21/24 Foot Dorsal;Right   Date First Assessed/Time First Assessed: 08/21/24 1436   Present on Original Admission: Yes  Wound Approximate Age at First Assessment (Weeks): 24 weeks  Primary Wound Type: Venous Ulcer  Location: Foot  Wound Location Orientation: Dorsal;Right   Dressing Status New dressing applied   Wound Cleansed Vashe   Dressing/Treatment Other (comment)  (BlastX to wound bed; Gauze; Mepilex or Silicone border)   Offloading for Diabetic Foot Ulcers Offloading not required   Pain Assessment   Pain Assessment None - Denies Pain   Pain Level 9   Patient's Stated Pain Goal 0 - No pain   Pain Location Foot   Pain Orientation Right   Pain Descriptors Burning     Discharge Condition: Stable    Pain: 9    Ambulatory Status:Wheelchair    Discharge Destination: Home    Transportation:Car    Accompanied by: Family    Discharge instructions reviewed with Self and Family and copy or written instructions have been provided. All questions/concerns have been addressed at this time.

## 2024-10-18 ENCOUNTER — TELEPHONE (OUTPATIENT)
Facility: HOSPITAL | Age: 74
End: 2024-10-18

## 2024-10-18 DIAGNOSIS — L97.512 RIGHT FOOT ULCER, WITH FAT LAYER EXPOSED (HCC): ICD-10-CM

## 2024-10-18 DIAGNOSIS — L97.312 ANKLE ULCER, RIGHT, WITH FAT LAYER EXPOSED (HCC): Primary | ICD-10-CM

## 2024-10-18 NOTE — TELEPHONE ENCOUNTER
Patient called with questions about timolol drops. Patient states that MD informed her to use drops every 5 days but script says with dressing changes. Informed patient to use drops every 5 days as MD instructed. Patient states that timolol weber but she will continue to use it every 5 days. No other needs at this time.

## 2024-10-23 ENCOUNTER — HOSPITAL ENCOUNTER (OUTPATIENT)
Facility: HOSPITAL | Age: 74
Discharge: HOME | End: 2024-10-23
Attending: FAMILY MEDICINE
Payer: MEDICARE

## 2024-10-23 VITALS
RESPIRATION RATE: 16 BRPM | SYSTOLIC BLOOD PRESSURE: 102 MMHG | HEART RATE: 103 BPM | DIASTOLIC BLOOD PRESSURE: 68 MMHG | TEMPERATURE: 98.4 F

## 2024-10-23 DIAGNOSIS — L97.512 RIGHT FOOT ULCER, WITH FAT LAYER EXPOSED (HCC): ICD-10-CM

## 2024-10-23 DIAGNOSIS — L97.312 ANKLE ULCER, RIGHT, WITH FAT LAYER EXPOSED (HCC): Primary | ICD-10-CM

## 2024-10-23 PROCEDURE — 11042 DBRDMT SUBQ TIS 1ST 20SQCM/<: CPT

## 2024-10-23 RX ORDER — LIDOCAINE HYDROCHLORIDE 20 MG/ML
JELLY TOPICAL ONCE
OUTPATIENT
Start: 2024-10-23 | End: 2024-10-23

## 2024-10-23 RX ORDER — NEOMYCIN/BACITRACIN/POLYMYXINB 3.5-400-5K
OINTMENT (GRAM) TOPICAL ONCE
OUTPATIENT
Start: 2024-10-23 | End: 2024-10-23

## 2024-10-23 RX ORDER — SODIUM CHLOR/HYPOCHLOROUS ACID 0.033 %
SOLUTION, IRRIGATION IRRIGATION ONCE
OUTPATIENT
Start: 2024-10-23 | End: 2024-10-23

## 2024-10-23 RX ORDER — TRIAMCINOLONE ACETONIDE 1 MG/G
OINTMENT TOPICAL ONCE
OUTPATIENT
Start: 2024-10-23 | End: 2024-10-23

## 2024-10-23 RX ORDER — BETAMETHASONE DIPROPIONATE 0.5 MG/G
CREAM TOPICAL ONCE
OUTPATIENT
Start: 2024-10-23 | End: 2024-10-23

## 2024-10-23 RX ORDER — GENTAMICIN SULFATE 1 MG/G
OINTMENT TOPICAL ONCE
OUTPATIENT
Start: 2024-10-23 | End: 2024-10-23

## 2024-10-23 RX ORDER — SODIUM CHLOR/HYPOCHLOROUS ACID 0.033 %
SOLUTION, IRRIGATION IRRIGATION ONCE
Status: COMPLETED | OUTPATIENT
Start: 2024-10-23 | End: 2024-10-23

## 2024-10-23 RX ORDER — LIDOCAINE HYDROCHLORIDE 40 MG/ML
SOLUTION TOPICAL ONCE
OUTPATIENT
Start: 2024-10-23 | End: 2024-10-23

## 2024-10-23 RX ORDER — MUPIROCIN 20 MG/G
OINTMENT TOPICAL ONCE
OUTPATIENT
Start: 2024-10-23 | End: 2024-10-23

## 2024-10-23 RX ORDER — BACITRACIN ZINC AND POLYMYXIN B SULFATE 500; 1000 [USP'U]/G; [USP'U]/G
OINTMENT TOPICAL ONCE
OUTPATIENT
Start: 2024-10-23 | End: 2024-10-23

## 2024-10-23 RX ORDER — LIDOCAINE 50 MG/G
OINTMENT TOPICAL ONCE
OUTPATIENT
Start: 2024-10-23 | End: 2024-10-23

## 2024-10-23 RX ORDER — CLOBETASOL PROPIONATE 0.5 MG/G
OINTMENT TOPICAL ONCE
OUTPATIENT
Start: 2024-10-23 | End: 2024-10-23

## 2024-10-23 RX ORDER — LIDOCAINE 40 MG/G
CREAM TOPICAL ONCE
OUTPATIENT
Start: 2024-10-23 | End: 2024-10-23

## 2024-10-23 RX ORDER — GINSENG 100 MG
CAPSULE ORAL ONCE
OUTPATIENT
Start: 2024-10-23 | End: 2024-10-23

## 2024-10-23 RX ADMIN — Medication: at 14:15

## 2024-10-23 NOTE — FLOWSHEET NOTE
10/23/24 1352   Anesthetic   Anesthetic 4% Lidocaine Cream   RLE Neurovascular Assessment   Capillary Refill Less than/Equal to 3 seconds   Color Appropriate for Ethnicity;Yellow-Brown/Hemosiderin Staining   Temperature Warm   RLE Sensation  Full sensation   R Pedal Pulse +2   Wound 08/21/24 Foot Dorsal;Right   Date First Assessed/Time First Assessed: 08/21/24 1436   Present on Original Admission: Yes  Wound Approximate Age at First Assessment (Weeks): 24 weeks  Primary Wound Type: Venous Ulcer  Location: Foot  Wound Location Orientation: Dorsal;Right   Wound Image    Wound Etiology Venous   Dressing Status Intact;Old drainage noted   Wound Cleansed Cleansed with saline   Offloading for Diabetic Foot Ulcers Offloading not required   Wound Length (cm) 0.4 cm   Wound Width (cm) 0.2 cm   Wound Depth (cm) 0.3 cm   Wound Surface Area (cm^2) 0.08 cm^2   Change in Wound Size % (l*w) 33.33   Wound Volume (cm^3) 0.024 cm^3   Wound Healing % -100   Drainage Amount Small (< 25%)   Drainage Description Serosanguinous   Odor None   Mirian-wound Assessment Hemosiderin staining (brown yellow)   Margins Defined edges   Wound Thickness Description not for Pressure Injury Full thickness   Wound 08/21/24 Ankle Anterior;Right;Lateral   Date First Assessed/Time First Assessed: 08/21/24 1434   Present on Original Admission: Yes  Wound Approximate Age at First Assessment (Weeks): 12 weeks  Primary Wound Type: Venous Ulcer  Location: Ankle  Wound Location Orientation: Anterior;Right;Lat...   Wound Image    Wound Etiology Venous   Dressing Status Intact;Old drainage noted   Wound Cleansed Cleansed with saline   Offloading for Diabetic Foot Ulcers Offloading not required   Wound Length (cm) 1.3 cm   Wound Width (cm) 0.4 cm   Wound Depth (cm) 0.2 cm   Wound Surface Area (cm^2) 0.52 cm^2   Change in Wound Size % (l*w) 13.33   Wound Volume (cm^3) 0.104 cm^3   Wound Healing % 13   Wound Assessment [place]/red;Slough   Drainage Amount Small (< 25%)

## 2024-10-23 NOTE — PATIENT INSTRUCTIONS
Discharge Instructions for  [facility]  [address] [address]  Phone: [phone] Fax: [phone]    NAME:  Chanelle Clay  YOB: 1950  MEDICAL RECORD NUMBER:  [id number]  DATE:  October 23, 2024    WOUND CARE ORDERS:   Right foot wounds- Cleanse with Vashe. Allow to sit for 5-7 minutes. Pat dry. In clinic apply Meka to wound. At home use timolol and BlastX to wound bed. Cover with gauze. Apply 2 layer calamine wrap.  Change at least 2 times a week.     Heelz Up cushion    Home Health ([facility]) - change dressings Monday, Wednesday, and Friday. If patient has an appointment in clinic on Wednesday then change on Monday and Friday that week.        Activity:  [x] Elevate leg(s) above the level of the heart when sitting.  [x] Avoid prolonged standing in one place.   [x] Do no get dressing/wrap wet.       Dietary:  [x] Diet as tolerated      [] Diabetic Diet            [x] Increase Protein: examples (Meat, cheese, eggs, greek yogurt, fish, nuts)          [] Дмитрий Therapeutic Nutrition Powder  [] Other:       Return Appointment:  [x] Return Appointment: With Dr. Gill in 2 Week  [] Nurse Visit   [] Ordered tests:     Wound Care Center Information: Should you experience any significant changes in your wound(s) or have questions about your wound care, please contact the [facility] at MONDAY - FRIDAY 8:00 am - 4:30.  If you need help with your wound outside these hours and cannot wait until we are again available, contact your PCP or go to the hospital emergency room.     PLEASE NOTE: IF YOU ARE UNABLE TO OBTAIN WOUND SUPPLIES, CONTINUE TO USE THE SUPPLIES YOU HAVE AVAILABLE UNTIL YOU ARE ABLE TO REACH US. IT IS MOST IMPORTANT TO KEEP THE WOUND COVERED AT ALL TIMES.     Physician Signature:_______________________    Date: ___________ Time:  ____________

## 2024-10-23 NOTE — FLOWSHEET NOTE
10/23/24 1450   Right Leg Edema Point of Measurement   Compression Therapy 2 layer compression wrap   Wound 08/21/24 Foot Dorsal;Right   Date First Assessed/Time First Assessed: 08/21/24 1436   Present on Original Admission: Yes  Wound Approximate Age at First Assessment (Weeks): 24 weeks  Primary Wound Type: Venous Ulcer  Location: Foot  Wound Location Orientation: Dorsal;Right   Dressing Status New dressing applied   Dressing/Treatment   (cherise, gauze, two layer calamine compresison wrap)   Offloading for Diabetic Foot Ulcers Offloading not required   Wound 08/21/24 Ankle Anterior;Right;Lateral   Date First Assessed/Time First Assessed: 08/21/24 1434   Present on Original Admission: Yes  Wound Approximate Age at First Assessment (Weeks): 12 weeks  Primary Wound Type: Venous Ulcer  Location: Ankle  Wound Location Orientation: Anterior;Right;Lat...   Dressing Status New dressing applied   Dressing/Treatment   (cherise, gauze, two layer calamine compresison wrap)   Offloading for Diabetic Foot Ulcers Offloading not required

## 2024-11-06 ENCOUNTER — HOSPITAL ENCOUNTER (OUTPATIENT)
Facility: HOSPITAL | Age: 74
Discharge: HOME | End: 2024-11-06
Attending: FAMILY MEDICINE
Payer: MEDICARE

## 2024-11-06 VITALS
DIASTOLIC BLOOD PRESSURE: 76 MMHG | TEMPERATURE: 97.9 F | RESPIRATION RATE: 16 BRPM | HEART RATE: 91 BPM | SYSTOLIC BLOOD PRESSURE: 125 MMHG

## 2024-11-06 DIAGNOSIS — L97.512 RIGHT FOOT ULCER, WITH FAT LAYER EXPOSED (HCC): ICD-10-CM

## 2024-11-06 DIAGNOSIS — L97.312 ANKLE ULCER, RIGHT, WITH FAT LAYER EXPOSED (HCC): Primary | ICD-10-CM

## 2024-11-06 PROCEDURE — 11042 DBRDMT SUBQ TIS 1ST 20SQCM/<: CPT

## 2024-11-06 RX ORDER — SODIUM CHLOR/HYPOCHLOROUS ACID 0.033 %
SOLUTION, IRRIGATION IRRIGATION ONCE
OUTPATIENT
Start: 2024-11-06 | End: 2024-11-06

## 2024-11-06 RX ORDER — BACITRACIN ZINC AND POLYMYXIN B SULFATE 500; 1000 [USP'U]/G; [USP'U]/G
OINTMENT TOPICAL ONCE
OUTPATIENT
Start: 2024-11-06 | End: 2024-11-06

## 2024-11-06 RX ORDER — LIDOCAINE 50 MG/G
OINTMENT TOPICAL ONCE
OUTPATIENT
Start: 2024-11-06 | End: 2024-11-06

## 2024-11-06 RX ORDER — NEOMYCIN/BACITRACIN/POLYMYXINB 3.5-400-5K
OINTMENT (GRAM) TOPICAL ONCE
OUTPATIENT
Start: 2024-11-06 | End: 2024-11-06

## 2024-11-06 RX ORDER — TRIAMCINOLONE ACETONIDE 1 MG/G
OINTMENT TOPICAL ONCE
OUTPATIENT
Start: 2024-11-06 | End: 2024-11-06

## 2024-11-06 RX ORDER — MUPIROCIN 20 MG/G
OINTMENT TOPICAL ONCE
OUTPATIENT
Start: 2024-11-06 | End: 2024-11-06

## 2024-11-06 RX ORDER — LIDOCAINE 40 MG/G
CREAM TOPICAL ONCE
OUTPATIENT
Start: 2024-11-06 | End: 2024-11-06

## 2024-11-06 RX ORDER — LIDOCAINE HYDROCHLORIDE 40 MG/ML
SOLUTION TOPICAL ONCE
OUTPATIENT
Start: 2024-11-06 | End: 2024-11-06

## 2024-11-06 RX ORDER — BETAMETHASONE DIPROPIONATE 0.5 MG/G
CREAM TOPICAL ONCE
OUTPATIENT
Start: 2024-11-06 | End: 2024-11-06

## 2024-11-06 RX ORDER — GENTAMICIN SULFATE 1 MG/G
OINTMENT TOPICAL ONCE
OUTPATIENT
Start: 2024-11-06 | End: 2024-11-06

## 2024-11-06 RX ORDER — LIDOCAINE HYDROCHLORIDE 20 MG/ML
JELLY TOPICAL ONCE
OUTPATIENT
Start: 2024-11-06 | End: 2024-11-06

## 2024-11-06 RX ORDER — GINSENG 100 MG
CAPSULE ORAL ONCE
OUTPATIENT
Start: 2024-11-06 | End: 2024-11-06

## 2024-11-06 RX ORDER — CLOBETASOL PROPIONATE 0.5 MG/G
OINTMENT TOPICAL ONCE
OUTPATIENT
Start: 2024-11-06 | End: 2024-11-06

## 2024-11-06 NOTE — FLOWSHEET NOTE
11/06/24 1603   Right Leg Edema Point of Measurement   Compression Therapy 2 layer compression wrap   Wound 08/21/24 Ankle Anterior;Right;Lateral   Date First Assessed/Time First Assessed: 08/21/24 1434   Present on Original Admission: Yes  Wound Approximate Age at First Assessment (Weeks): 12 weeks  Primary Wound Type: Venous Ulcer  Location: Ankle  Wound Location Orientation: Anterior;Right;Lat...   Dressing Status New dressing applied   Wound Cleansed Vashe   Dressing/Treatment   (cherise, gauze, foam, 2 layer compression wrap)   Offloading for Diabetic Foot Ulcers Offloading not required   Wound 08/21/24 Foot Dorsal;Right   Date First Assessed/Time First Assessed: 08/21/24 1436   Present on Original Admission: Yes  Wound Approximate Age at First Assessment (Weeks): 24 weeks  Primary Wound Type: Venous Ulcer  Location: Foot  Wound Location Orientation: Dorsal;Right   Dressing Status New dressing applied   Wound Cleansed Vashe   Dressing/Treatment   (cherise, gauze, foam, 2 layer compression wrap)   Offloading for Diabetic Foot Ulcers Offloading not required

## 2024-11-06 NOTE — PATIENT INSTRUCTIONS
Discharge Instructions for  [facility]  [address] [address]  Phone: [phone] Fax: [phone]    NAME:  Chanelle Clay  YOB: 1950  MEDICAL RECORD NUMBER:  [id number]  DATE:  November 6, 2024    WOUND CARE ORDERS:   Right foot wounds- Cleanse with Vashe. Allow to sit for 5-7 minutes. Pat dry. Apply A&D ointment to  leg. Apply one drop of timolol to wound bed. Let sit for a couple of minutes.In clinic apply gentamicin to wound. At home use BlastX to wound bed. Cover with 4x4 silicone border foam. Apply 2 layer calamine wrap.  Change at 3 times a week.     Heelz Up cushion    Home Health ([facility]) - change dressings Monday, Wednesday, and Friday. If patient has an appointment in clinic on Wednesday then change on Monday and Friday that week.        Activity:  [x] Elevate leg(s) above the level of the heart when sitting.  [x] Avoid prolonged standing in one place.   [x] Do no get dressing/wrap wet.       Dietary:  [x] Diet as tolerated      [] Diabetic Diet            [x] Increase Protein: examples (Meat, cheese, eggs, greek yogurt, fish, nuts)          [] Дмитрий Therapeutic Nutrition Powder  [] Other:       Return Appointment:  [x] Return Appointment: With Dr. Gill in 2 Weeks  [] Nurse Visit   [] Ordered tests:     Wound Care Center Information: Should you experience any significant changes in your wound(s) or have questions about your wound care, please contact the [facility] at MONDAY - FRIDAY 8:00 am - 4:30.  If you need help with your wound outside these hours and cannot wait until we are again available, contact your PCP or go to the hospital emergency room.     PLEASE NOTE: IF YOU ARE UNABLE TO OBTAIN WOUND SUPPLIES, CONTINUE TO USE THE SUPPLIES YOU HAVE AVAILABLE UNTIL YOU ARE ABLE TO REACH US. IT IS MOST IMPORTANT TO KEEP THE WOUND COVERED AT ALL TIMES.     Physician Signature:_______________________    Date:

## 2024-11-06 NOTE — FLOWSHEET NOTE
11/06/24 1411   Anesthetic   Anesthetic 4% Lidocaine Cream   Right Leg Edema Point of Measurement   Leg circumference 35.5 cm   Ankle circumference 19 cm   Peripheral Vascular   RLE Edema None   RLE Neurovascular Assessment   Capillary Refill Less than/Equal to 3 seconds   Color Appropriate for Ethnicity   Temperature Warm   RLE Sensation  Full sensation   R Pedal Pulse +2   Wound 08/21/24 Ankle Anterior;Right;Lateral   Date First Assessed/Time First Assessed: 08/21/24 1434   Present on Original Admission: Yes  Wound Approximate Age at First Assessment (Weeks): 12 weeks  Primary Wound Type: Venous Ulcer  Location: Ankle  Wound Location Orientation: Anterior;Right;Lat...   Wound Image    Wound Etiology Venous   Dressing Status Intact;Old drainage noted   Wound Cleansed Cleansed with saline   Offloading for Diabetic Foot Ulcers Offloading not required   Wound Length (cm) 1.6 cm   Wound Width (cm) 0.3 cm   Wound Depth (cm) 0.2 cm   Wound Surface Area (cm^2) 0.48 cm^2   Change in Wound Size % (l*w) 20   Wound Volume (cm^3) 0.096 cm^3   Wound Healing % 20   Wound Assessment [place]/red;Slough   Drainage Amount Small (< 25%)   Drainage Description Serosanguinous   Odor None   Mirian-wound Assessment Hemosiderin staining (brown yellow)   Margins Defined edges   Wound Thickness Description not for Pressure Injury Full thickness   Wound 08/21/24 Foot Dorsal;Right   Date First Assessed/Time First Assessed: 08/21/24 1436   Present on Original Admission: Yes  Wound Approximate Age at First Assessment (Weeks): 24 weeks  Primary Wound Type: Venous Ulcer  Location: Foot  Wound Location Orientation: Dorsal;Right   Wound Image    Wound Etiology Venous   Dressing Status Intact;Old drainage noted   Wound Cleansed Cleansed with saline   Offloading for Diabetic Foot Ulcers Offloading not required   Wound Length (cm) 0.8 cm   Wound Width (cm) 0.2 cm   Wound Depth (cm) 0.2 cm   Wound Surface Area (cm^2) 0.16 cm^2   Change in Wound Size %

## 2024-11-06 NOTE — PROGRESS NOTES
cm^2 11/06/24 1411   Change in Wound Size % (l*w) 20 11/06/24 1411   Wound Volume (cm^3) 0.096 cm^3 11/06/24 1411   Wound Healing % 20 11/06/24 1411   Post-Procedure Length (cm) 1.6 cm 11/06/24 1422   Post-Procedure Width (cm) 0.3 cm 11/06/24 1422   Post-Procedure Depth (cm) 0.4 cm 11/06/24 1422   Post-Procedure Surface Area (cm^2) 0.48 cm^2 11/06/24 1422   Post-Procedure Volume (cm^3) 0.192 cm^3 11/06/24 1422   Wound Assessment Pink/red;Slough 11/06/24 1411   Drainage Amount Small (< 25%) 11/06/24 1411   Drainage Description Serosanguinous 11/06/24 1411   Odor None 11/06/24 1411   Mirian-wound Assessment Hemosiderin staining (brown yellow) 11/06/24 1411   Margins Defined edges 11/06/24 1411   Wound Thickness Description not for Pressure Injury Full thickness 11/06/24 1411   Number of days: 77       Wound 08/21/24 Foot Dorsal;Right (Active)   Wound Image   11/06/24 1411   Wound Etiology Venous 11/06/24 1411   Dressing Status Intact;Old drainage noted 11/06/24 1411   Wound Cleansed Cleansed with saline 11/06/24 1411   Dressing/Treatment Other (comment) 10/15/24 1630   Offloading for Diabetic Foot Ulcers Offloading not required 11/06/24 1411   Wound Length (cm) 0.8 cm 11/06/24 1411   Wound Width (cm) 0.2 cm 11/06/24 1411   Wound Depth (cm) 0.2 cm 11/06/24 1411   Wound Surface Area (cm^2) 0.16 cm^2 11/06/24 1411   Change in Wound Size % (l*w) -33.33 11/06/24 1411   Wound Volume (cm^3) 0.032 cm^3 11/06/24 1411   Wound Healing % -167 11/06/24 1411   Post-Procedure Length (cm) 0.8 cm 11/06/24 1422   Post-Procedure Width (cm) 0.2 cm 11/06/24 1422   Post-Procedure Depth (cm) 0.4 cm 11/06/24 1422   Post-Procedure Surface Area (cm^2) 0.16 cm^2 11/06/24 1422   Post-Procedure Volume (cm^3) 0.064 cm^3 11/06/24 1422   Wound Assessment Pink/red 11/06/24 1411   Drainage Amount Small (< 25%) 11/06/24 1411   Drainage Description Serosanguinous 11/06/24 1411   Odor None 11/06/24 1411   Mirian-wound Assessment Hemosiderin staining (brown

## 2024-11-20 ENCOUNTER — HOSPITAL ENCOUNTER (OUTPATIENT)
Facility: HOSPITAL | Age: 74
Discharge: HOME | End: 2024-11-20
Attending: FAMILY MEDICINE
Payer: MEDICARE

## 2024-11-20 VITALS
TEMPERATURE: 97.6 F | HEART RATE: 102 BPM | SYSTOLIC BLOOD PRESSURE: 106 MMHG | DIASTOLIC BLOOD PRESSURE: 72 MMHG | RESPIRATION RATE: 16 BRPM

## 2024-11-20 DIAGNOSIS — L97.312 ANKLE ULCER, RIGHT, WITH FAT LAYER EXPOSED (HCC): Primary | ICD-10-CM

## 2024-11-20 DIAGNOSIS — L97.512 RIGHT FOOT ULCER, WITH FAT LAYER EXPOSED (HCC): ICD-10-CM

## 2024-11-20 PROCEDURE — 11042 DBRDMT SUBQ TIS 1ST 20SQCM/<: CPT

## 2024-11-20 RX ORDER — LIDOCAINE HYDROCHLORIDE 20 MG/ML
JELLY TOPICAL ONCE
OUTPATIENT
Start: 2024-11-20 | End: 2024-11-20

## 2024-11-20 RX ORDER — SODIUM CHLOR/HYPOCHLOROUS ACID 0.033 %
SOLUTION, IRRIGATION IRRIGATION ONCE
OUTPATIENT
Start: 2024-11-20 | End: 2024-11-20

## 2024-11-20 RX ORDER — BETAMETHASONE DIPROPIONATE 0.5 MG/G
CREAM TOPICAL ONCE
OUTPATIENT
Start: 2024-11-20 | End: 2024-11-20

## 2024-11-20 RX ORDER — LIDOCAINE HYDROCHLORIDE 40 MG/ML
SOLUTION TOPICAL ONCE
OUTPATIENT
Start: 2024-11-20 | End: 2024-11-20

## 2024-11-20 RX ORDER — LIDOCAINE 40 MG/G
CREAM TOPICAL ONCE
OUTPATIENT
Start: 2024-11-20 | End: 2024-11-20

## 2024-11-20 RX ORDER — NEOMYCIN/BACITRACIN/POLYMYXINB 3.5-400-5K
OINTMENT (GRAM) TOPICAL ONCE
OUTPATIENT
Start: 2024-11-20 | End: 2024-11-20

## 2024-11-20 RX ORDER — LIDOCAINE 50 MG/G
OINTMENT TOPICAL ONCE
OUTPATIENT
Start: 2024-11-20 | End: 2024-11-20

## 2024-11-20 RX ORDER — BACITRACIN ZINC AND POLYMYXIN B SULFATE 500; 1000 [USP'U]/G; [USP'U]/G
OINTMENT TOPICAL ONCE
OUTPATIENT
Start: 2024-11-20 | End: 2024-11-20

## 2024-11-20 RX ORDER — GINSENG 100 MG
CAPSULE ORAL ONCE
OUTPATIENT
Start: 2024-11-20 | End: 2024-11-20

## 2024-11-20 RX ORDER — MUPIROCIN 20 MG/G
OINTMENT TOPICAL ONCE
OUTPATIENT
Start: 2024-11-20 | End: 2024-11-20

## 2024-11-20 RX ORDER — GENTAMICIN SULFATE 1 MG/G
OINTMENT TOPICAL ONCE
OUTPATIENT
Start: 2024-11-20 | End: 2024-11-20

## 2024-11-20 RX ORDER — TRIAMCINOLONE ACETONIDE 1 MG/G
OINTMENT TOPICAL ONCE
OUTPATIENT
Start: 2024-11-20 | End: 2024-11-20

## 2024-11-20 RX ORDER — CLOBETASOL PROPIONATE 0.5 MG/G
OINTMENT TOPICAL ONCE
OUTPATIENT
Start: 2024-11-20 | End: 2024-11-20

## 2024-11-20 RX ORDER — SODIUM CHLOR/HYPOCHLOROUS ACID 0.033 %
SOLUTION, IRRIGATION IRRIGATION ONCE
Status: COMPLETED | OUTPATIENT
Start: 2024-11-20 | End: 2024-11-20

## 2024-11-20 RX ADMIN — Medication: at 14:39

## 2024-11-20 NOTE — FLOWSHEET NOTE
11/20/24 1500   Right Leg Edema Point of Measurement   Compression Therapy 2 layer compression wrap   Wound 08/21/24 Ankle Anterior;Right;Lateral   Date First Assessed/Time First Assessed: 08/21/24 1434   Present on Original Admission: Yes  Wound Approximate Age at First Assessment (Weeks): 12 weeks  Primary Wound Type: Venous Ulcer  Location: Ankle  Wound Location Orientation: Anterior;Right;Lat...   Dressing Status New dressing applied   Wound Cleansed Vashe   Dressing/Treatment Foam  (timolol, blastX, 2 layer calamine wrap)   Offloading for Diabetic Foot Ulcers Offloading not required   Wound 08/21/24 Foot Dorsal;Right   Date First Assessed/Time First Assessed: 08/21/24 1436   Present on Original Admission: Yes  Wound Approximate Age at First Assessment (Weeks): 24 weeks  Primary Wound Type: Venous Ulcer  Location: Foot  Wound Location Orientation: Dorsal;Right   Dressing Status New dressing applied   Wound Cleansed Vashe   Dressing/Treatment Foam  (timolol, blastX, 2 layer calamine wrap)   Offloading for Diabetic Foot Ulcers Offloading not required

## 2024-11-20 NOTE — FLOWSHEET NOTE
11/20/24 1354   Anesthetic   Anesthetic 4% Lidocaine Cream   Right Leg Edema Point of Measurement   Leg circumference 35.5 cm   Ankle circumference 19 cm   Compression Therapy 2 layer compression wrap   Peripheral Vascular   RLE Edema None   RLE Neurovascular Assessment   Capillary Refill Less than/Equal to 3 seconds   Color Appropriate for Ethnicity;Yellow-Brown/Hemosiderin Staining   Temperature Warm   RLE Sensation  Full sensation   R Pedal Pulse +2   Wound 08/21/24 Ankle Anterior;Right;Lateral   Date First Assessed/Time First Assessed: 08/21/24 1434   Present on Original Admission: Yes  Wound Approximate Age at First Assessment (Weeks): 12 weeks  Primary Wound Type: Venous Ulcer  Location: Ankle  Wound Location Orientation: Anterior;Right;Lat...   Wound Image    Wound Etiology Venous   Dressing Status Intact   Wound Cleansed Soap and water   Offloading for Diabetic Foot Ulcers Offloading not required   Wound Length (cm) 0.7 cm   Wound Width (cm) 0.2 cm   Wound Depth (cm) 0.2 cm   Wound Surface Area (cm^2) 0.14 cm^2   Change in Wound Size % (l*w) 76.67   Wound Volume (cm^3) 0.028 cm^3   Wound Healing % 77   Wound Assessment Eschar dry   Drainage Amount Moderate (25-50%)   Drainage Description Serosanguinous   Odor None   Mirian-wound Assessment Hemosiderin staining (brown yellow)   Margins Defined edges   Wound Thickness Description not for Pressure Injury Full thickness   Wound 08/21/24 Foot Dorsal;Right   Date First Assessed/Time First Assessed: 08/21/24 1436   Present on Original Admission: Yes  Wound Approximate Age at First Assessment (Weeks): 24 weeks  Primary Wound Type: Venous Ulcer  Location: Foot  Wound Location Orientation: Dorsal;Right   Wound Image    Wound Etiology Venous   Dressing Status Intact;Old drainage noted   Wound Cleansed Soap and water   Offloading for Diabetic Foot Ulcers Offloading not required   Wound Length (cm) 0.5 cm   Wound Width (cm) 0.2 cm   Wound Depth (cm) 0.1 cm   Wound

## 2024-11-20 NOTE — PATIENT INSTRUCTIONS
Discharge Instructions for  [facility]  [address] [address]  Phone: [phone] Fax: [phone]    NAME:  Chanelle Clay  YOB: 1950  MEDICAL RECORD NUMBER:  [id number]  DATE:  November 20, 2024    WOUND CARE ORDERS:   Right leg wounds- Cleanse RLE wounds using Vashe and woven gauze. Allow to sit for 5-7 minutes. Pat dry. Apply A&D ointment to lower leg. Apply one gtt of timolol to each wound bed and allow to dry.In clinic apply gentamicin to wound. At home apply small amount of BlastX to wound bed. Apply small square of optifoam gentle absorbant foam to wound. Apply 2 layer calamine wrap. Skilled nursing to change 3 times a week.     Heelz Up cushion    Home Health ([facility]) - change dressings Monday, Wednesday, and Friday. If patient has an appointment in clinic on Wednesday then change on Monday and Friday that week.        Activity:  [x] Elevate leg(s) above the level of the heart when sitting.  [x] Avoid prolonged standing in one place.   [x] Do no get dressing/wrap wet.       Dietary:  [x] Diet as tolerated      [] Diabetic Diet            [x] Increase Protein: examples (Meat, cheese, eggs, greek yogurt, fish, nuts)          [] Дмитрий Therapeutic Nutrition Powder  [] Other:       Return Appointment:  [x] Return Appointment: With Dr. Gill in 4 Weeks  [] Nurse Visit   [] Ordered tests:     Wound Care Center Information: Should you experience any significant changes in your wound(s) or have questions about your wound care, please contact the [facility] at MONDAY - FRIDAY 8:00 am - 4:30.  If you need help with your wound outside these hours and cannot wait until we are again available, contact your PCP or go to the hospital emergency room.     PLEASE NOTE: IF YOU ARE UNABLE TO OBTAIN WOUND SUPPLIES, CONTINUE TO USE THE SUPPLIES YOU HAVE AVAILABLE UNTIL YOU ARE ABLE TO REACH US. IT IS MOST IMPORTANT TO KEEP THE WOUND COVERED

## 2024-11-20 NOTE — PROGRESS NOTES
Wound Center  Progress Note / Procedure Note      Chief Complaint:  Chanelle Clay is a 74 y.o. female  with Right foot and ankle wound since February 2023      Assessment/Plan     74 y.o. female with     -R ankle anterior chronic ulcer.  Re-opened  Full thickness  Smaller,   Yellow scab  Necessitates debridement  for wound healing and to prevent/heal infection  Ulcer needs debridement- see note below      -R foot anterior chronic ulcer.  Re-opened  Periwound discolored  Full thickness  Smaller,   Yellow scab  Necessitates debridement  for wound healing and to prevent/heal infection  Ulcer needs debridement- see note below    Wart/corn?  Removed from left plantar foot using callus  Tolerated well  No pain, no bleeding    Left lateral ankle  Mild DTI- fading  No open areas  Offload      Leg swelling  Elevate  Compression  R/o venous insufficiency   venous reflux test normal  Still recommended compression- as it healed with it last time, patient agreed      -Nicotine dependence  Aware of Effects of nicotine on wound healing-   Has cut back from 1.5ppd to 10 cig/day      -Vascular  Normal ABIs/TBIs/ in April 2023          Following discussed with patient / sister  Needs :  Serial debridement- prn    Good local wound care  Wash with Vashe    Dressing:  timolol, blastx  Frequency : 3x/week    Continue 2 layer calamine wrap    Continue Home Health- explicit instructions given to HH      Patient/  understood and agrees with plan. Questions answered.    Follow up  in 4 week          Subjective:   Since last visit  11/20 no issues    10/23 saw Dr ALONSO; timolol added.  timolol burnt on freshly debrided wound, otherwise does ok    10/2 frustrated/upset, wound not healing    9/18 wound care going well  Not happy with one of the Home health nurses, but working with agency    9/4  HH started, only been out once      8/21  Patient returns today for same wounds  Last seen 3/6 when wound s had healed  Opened up few weeks ago  Very

## 2024-12-18 ENCOUNTER — HOSPITAL ENCOUNTER (OUTPATIENT)
Facility: HOSPITAL | Age: 74
Discharge: HOME | End: 2024-12-18
Attending: FAMILY MEDICINE
Payer: MEDICARE

## 2024-12-18 VITALS — DIASTOLIC BLOOD PRESSURE: 63 MMHG | HEART RATE: 91 BPM | SYSTOLIC BLOOD PRESSURE: 103 MMHG | TEMPERATURE: 97.5 F

## 2024-12-18 DIAGNOSIS — L97.512 RIGHT FOOT ULCER, WITH FAT LAYER EXPOSED (HCC): ICD-10-CM

## 2024-12-18 DIAGNOSIS — L97.312 ANKLE ULCER, RIGHT, WITH FAT LAYER EXPOSED (HCC): Primary | ICD-10-CM

## 2024-12-18 PROCEDURE — 11042 DBRDMT SUBQ TIS 1ST 20SQCM/<: CPT

## 2024-12-18 PROCEDURE — 99212 OFFICE O/P EST SF 10 MIN: CPT

## 2024-12-18 RX ORDER — CLOBETASOL PROPIONATE 0.5 MG/G
OINTMENT TOPICAL ONCE
OUTPATIENT
Start: 2024-12-18 | End: 2024-12-18

## 2024-12-18 RX ORDER — BETAMETHASONE DIPROPIONATE 0.5 MG/G
CREAM TOPICAL ONCE
OUTPATIENT
Start: 2024-12-18 | End: 2024-12-18

## 2024-12-18 RX ORDER — SODIUM CHLOR/HYPOCHLOROUS ACID 0.033 %
SOLUTION, IRRIGATION IRRIGATION ONCE
Status: COMPLETED | OUTPATIENT
Start: 2024-12-18 | End: 2024-12-18

## 2024-12-18 RX ORDER — LIDOCAINE HYDROCHLORIDE 20 MG/ML
JELLY TOPICAL ONCE
OUTPATIENT
Start: 2024-12-18 | End: 2024-12-18

## 2024-12-18 RX ORDER — BACITRACIN ZINC AND POLYMYXIN B SULFATE 500; 1000 [USP'U]/G; [USP'U]/G
OINTMENT TOPICAL ONCE
OUTPATIENT
Start: 2024-12-18 | End: 2024-12-18

## 2024-12-18 RX ORDER — GINSENG 100 MG
CAPSULE ORAL ONCE
OUTPATIENT
Start: 2024-12-18 | End: 2024-12-18

## 2024-12-18 RX ORDER — LIDOCAINE 50 MG/G
OINTMENT TOPICAL ONCE
OUTPATIENT
Start: 2024-12-18 | End: 2024-12-18

## 2024-12-18 RX ORDER — LIDOCAINE HYDROCHLORIDE 40 MG/ML
SOLUTION TOPICAL ONCE
OUTPATIENT
Start: 2024-12-18 | End: 2024-12-18

## 2024-12-18 RX ORDER — GENTAMICIN SULFATE 1 MG/G
OINTMENT TOPICAL ONCE
OUTPATIENT
Start: 2024-12-18 | End: 2024-12-18

## 2024-12-18 RX ORDER — MUPIROCIN 20 MG/G
OINTMENT TOPICAL ONCE
OUTPATIENT
Start: 2024-12-18 | End: 2024-12-18

## 2024-12-18 RX ORDER — TRIAMCINOLONE ACETONIDE 1 MG/G
OINTMENT TOPICAL ONCE
OUTPATIENT
Start: 2024-12-18 | End: 2024-12-18

## 2024-12-18 RX ORDER — NEOMYCIN/BACITRACIN/POLYMYXINB 3.5-400-5K
OINTMENT (GRAM) TOPICAL ONCE
OUTPATIENT
Start: 2024-12-18 | End: 2024-12-18

## 2024-12-18 RX ORDER — SODIUM CHLOR/HYPOCHLOROUS ACID 0.033 %
SOLUTION, IRRIGATION IRRIGATION ONCE
OUTPATIENT
Start: 2024-12-18 | End: 2024-12-18

## 2024-12-18 RX ORDER — LIDOCAINE 40 MG/G
CREAM TOPICAL ONCE
OUTPATIENT
Start: 2024-12-18 | End: 2024-12-18

## 2024-12-18 RX ADMIN — Medication: at 14:05

## 2024-12-18 ASSESSMENT — PAIN SCALES - GENERAL: PAINLEVEL_OUTOF10: 2

## 2024-12-18 ASSESSMENT — PAIN DESCRIPTION - ORIENTATION: ORIENTATION: RIGHT

## 2024-12-18 ASSESSMENT — PAIN DESCRIPTION - LOCATION: LOCATION: FOOT

## 2024-12-18 NOTE — FLOWSHEET NOTE
12/18/24 1326   Anesthetic   Anesthetic 4% Lidocaine Liquid Topical   Right Leg Edema Point of Measurement   Leg circumference 35 cm   Ankle circumference 20 cm   RLE Neurovascular Assessment   Color Appropriate for Ethnicity   Temperature Warm   RLE Sensation  Full sensation   R Pedal Pulse +2   Wound 08/21/24 Ankle Anterior;Right;Lateral   Date First Assessed/Time First Assessed: 08/21/24 1434   Present on Original Admission: Yes  Wound Approximate Age at First Assessment (Weeks): 12 weeks  Primary Wound Type: Venous Ulcer  Location: Ankle  Wound Location Orientation: Anterior;Right;Lat...   Wound Image    Wound Etiology Venous   Dressing Status Intact   Wound Length (cm) 0.7 cm   Wound Width (cm) 0.2 cm   Wound Depth (cm) 0.1 cm   Wound Surface Area (cm^2) 0.14 cm^2   Change in Wound Size % (l*w) 76.67   Wound Volume (cm^3) 0.014 cm^3   Wound Healing % 88   Wound Assessment Eschar dry   Drainage Amount None (dry)   Odor None   Mirian-wound Assessment Hemosiderin staining (brown yellow)   Margins Undefined edges   Wound Thickness Description not for Pressure Injury Full thickness   Pain Assessment   Pain Assessment None - Denies Pain   Pain Level 2   Patient's Stated Pain Goal 0 - No pain   Pain Location Foot   Pain Orientation Right     /63   Pulse 91   Temp 97.5 °F (36.4 °C) (Temporal)

## 2024-12-18 NOTE — PATIENT INSTRUCTIONS
Discharge Instructions for  [facility]  [address] [address]  Phone: [phone] Fax: [phone]    NAME:  Chanelle Clay  YOB: 1950  MEDICAL RECORD NUMBER:  [id number]  DATE:  December 18, 2024    WOUND CARE ORDERS:   Right leg wounds- Cleanse RLE wounds using Vashe and woven gauze. Allow to sit for 5-7 minutes. Pat dry.  Apply small amount of BlastX to wound bed. Cover with silicone border foam. Change after showers, 3-4 times a week. Do not get dressing wet.     Home Health ([facility]) -  May discontinue home health services at this time.       Activity:  [x] Elevate leg(s) above the level of the heart when sitting.  [x] Avoid prolonged standing in one place.   [x] Do no get dressing/wrap wet.       Dietary:  [x] Diet as tolerated      [] Diabetic Diet            [x] Increase Protein: examples (Meat, cheese, eggs, greek yogurt, fish, nuts)          [] Дмитрий Therapeutic Nutrition Powder  [] Other:       Return Appointment:  [x] Return Appointment: With Dr. Gill in 3 Weeks  [] Nurse Visit   [] Ordered tests:     Wound Care Center Information: Should you experience any significant changes in your wound(s) or have questions about your wound care, please contact the [facility] at MONDAY - FRIDAY 8:00 am - 4:30.  If you need help with your wound outside these hours and cannot wait until we are again available, contact your PCP or go to the hospital emergency room.     PLEASE NOTE: IF YOU ARE UNABLE TO OBTAIN WOUND SUPPLIES, CONTINUE TO USE THE SUPPLIES YOU HAVE AVAILABLE UNTIL YOU ARE ABLE TO REACH US. IT IS MOST IMPORTANT TO KEEP THE WOUND COVERED AT ALL TIMES.     Physician Signature:_______________________    Date: ___________ Time:  ____________

## 2024-12-18 NOTE — FLOWSHEET NOTE
12/18/24 1326   Anesthetic   Anesthetic 4% Lidocaine Liquid Topical   Right Leg Edema Point of Measurement   Leg circumference 35 cm   Ankle circumference 20 cm   RLE Neurovascular Assessment   Color Appropriate for Ethnicity   Temperature Warm   RLE Sensation  Full sensation   R Pedal Pulse +2   Wound 08/21/24 Ankle Anterior;Right;Lateral   Date First Assessed/Time First Assessed: 08/21/24 1434   Present on Original Admission: Yes  Wound Approximate Age at First Assessment (Weeks): 12 weeks  Primary Wound Type: Venous Ulcer  Location: Ankle  Wound Location Orientation: Anterior;Right;Lat...   Wound Image    Wound Etiology Venous   Dressing Status Intact   Wound Length (cm) 0.7 cm   Wound Width (cm) 0.2 cm   Wound Depth (cm) 0.1 cm   Wound Surface Area (cm^2) 0.14 cm^2   Change in Wound Size % (l*w) 76.67   Wound Volume (cm^3) 0.014 cm^3   Wound Healing % 88   Wound Assessment Eschar dry   Drainage Amount None (dry)   Odor None   Mirian-wound Assessment Hemosiderin staining (brown yellow)   Margins Undefined edges   Wound Thickness Description not for Pressure Injury Full thickness   Wound 08/21/24 Foot Dorsal;Right   Date First Assessed/Time First Assessed: 08/21/24 1436   Present on Original Admission: Yes  Wound Approximate Age at First Assessment (Weeks): 24 weeks  Primary Wound Type: Venous Ulcer  Location: Foot  Wound Location Orientation: Dorsal;Right   Wound Image    Wound Etiology Venous   Dressing Status Intact   Wound Cleansed Cleansed with saline   Wound Length (cm) 0.4 cm   Wound Width (cm) 0.2 cm   Wound Depth (cm) 0.1 cm   Wound Surface Area (cm^2) 0.06 cm^2   Change in Wound Size % (l*w) 50   Wound Volume (cm^3) 0.006 cm^3   Wound Healing % 50   Wound Assessment Eschar dry   Drainage Amount None (dry)   Odor None   Mirian-wound Assessment Hemosiderin staining (brown yellow)   Margins Undefined edges   Wound Thickness Description not for Pressure Injury Full thickness   Pain Assessment   Pain

## 2024-12-18 NOTE — PROGRESS NOTES
11/20/24 1431   Post-Procedure Depth (cm) 0.2 cm 11/20/24 1431   Post-Procedure Surface Area (cm^2) 0.1 cm^2 11/20/24 1431   Post-Procedure Volume (cm^3) 0.02 cm^3 11/20/24 1431   Wound Assessment Eschar dry 12/18/24 1326   Drainage Amount None (dry) 12/18/24 1326   Drainage Description Serosanguinous 11/20/24 1354   Odor None 12/18/24 1326   Mirian-wound Assessment Hemosiderin staining (brown yellow) 12/18/24 1326   Margins Undefined edges 12/18/24 1326   Wound Thickness Description not for Pressure Injury Full thickness 12/18/24 1326   Number of days: 119          -------    Past Medical History:   Diagnosis Date    High cholesterol     Macular degeneration     Thyroid disease      Past Surgical History:   Procedure Laterality Date    COLONOSCOPY N/A 10/7/2020    COLONOSCOPY performed by César King MD at [facility] ENDOSCOPY    HYSTERECTOMY (CERVIX STATUS UNKNOWN)      ORTHOPEDIC SURGERY      carpal tunnel      History reviewed. No pertinent family history.  Social History     Socioeconomic History    Marital status: Single     Spouse name: None    Number of children: None    Years of education: None    Highest education level: None   Tobacco Use    Smoking status: Every Day     Current packs/day: 0.50     Types: Cigarettes    Smokeless tobacco: Never   Substance and Sexual Activity    Alcohol use: Yes     Alcohol/week: 4.0 standard drinks of alcohol    Drug use: Not Currently        Prior to Admission medications    Medication Sig Start Date End Date Taking? Authorizing Provider   Cholecalciferol (VITAMIN D3) 125 MCG (5000 UT) TABS Take 1 tablet by mouth   Yes Franky Carrillo MD   Cyanocobalamin (B-12) 3000 MCG CAPS Take 1 Dose by mouth   Yes Franky Carrillo MD   albuterol sulfate HFA (VENTOLIN HFA) 108 (90 Base) MCG/ACT inhaler Inhale 2 puffs into the lungs every 6 hours as needed for Wheezing   Yes Franky Carrillo MD   Biotin 5000 MCG CAPS Take 1 Dose by mouth   Yes Franky Carrillo MD

## 2025-01-15 ENCOUNTER — HOSPITAL ENCOUNTER (OUTPATIENT)
Facility: HOSPITAL | Age: 75
Discharge: HOME | End: 2025-01-15
Attending: FAMILY MEDICINE
Payer: MEDICARE

## 2025-01-15 VITALS
RESPIRATION RATE: 16 BRPM | TEMPERATURE: 98.2 F | DIASTOLIC BLOOD PRESSURE: 75 MMHG | SYSTOLIC BLOOD PRESSURE: 116 MMHG | HEART RATE: 91 BPM

## 2025-01-15 DIAGNOSIS — L97.512 RIGHT FOOT ULCER, WITH FAT LAYER EXPOSED (HCC): ICD-10-CM

## 2025-01-15 DIAGNOSIS — L97.312 ANKLE ULCER, RIGHT, WITH FAT LAYER EXPOSED (HCC): Primary | ICD-10-CM

## 2025-01-15 PROCEDURE — 11055 PARING/CUTG B9 HYPRKER LES 1: CPT

## 2025-01-15 RX ORDER — LIDOCAINE 40 MG/G
CREAM TOPICAL ONCE
OUTPATIENT
Start: 2025-01-15 | End: 2025-01-15

## 2025-01-15 RX ORDER — LIDOCAINE HYDROCHLORIDE 40 MG/ML
SOLUTION TOPICAL ONCE
OUTPATIENT
Start: 2025-01-15 | End: 2025-01-15

## 2025-01-15 RX ORDER — BACITRACIN ZINC AND POLYMYXIN B SULFATE 500; 1000 [USP'U]/G; [USP'U]/G
OINTMENT TOPICAL ONCE
OUTPATIENT
Start: 2025-01-15 | End: 2025-01-15

## 2025-01-15 RX ORDER — LIDOCAINE 50 MG/G
OINTMENT TOPICAL ONCE
OUTPATIENT
Start: 2025-01-15 | End: 2025-01-15

## 2025-01-15 RX ORDER — LIDOCAINE HYDROCHLORIDE 20 MG/ML
JELLY TOPICAL ONCE
OUTPATIENT
Start: 2025-01-15 | End: 2025-01-15

## 2025-01-15 RX ORDER — SODIUM CHLOR/HYPOCHLOROUS ACID 0.033 %
SOLUTION, IRRIGATION IRRIGATION ONCE
OUTPATIENT
Start: 2025-01-15 | End: 2025-01-15

## 2025-01-15 RX ORDER — BETAMETHASONE DIPROPIONATE 0.5 MG/G
CREAM TOPICAL ONCE
OUTPATIENT
Start: 2025-01-15 | End: 2025-01-15

## 2025-01-15 RX ORDER — GINSENG 100 MG
CAPSULE ORAL ONCE
OUTPATIENT
Start: 2025-01-15 | End: 2025-01-15

## 2025-01-15 RX ORDER — GENTAMICIN SULFATE 1 MG/G
OINTMENT TOPICAL ONCE
OUTPATIENT
Start: 2025-01-15 | End: 2025-01-15

## 2025-01-15 RX ORDER — TRIAMCINOLONE ACETONIDE 1 MG/G
OINTMENT TOPICAL ONCE
OUTPATIENT
Start: 2025-01-15 | End: 2025-01-15

## 2025-01-15 RX ORDER — CLOBETASOL PROPIONATE 0.5 MG/G
OINTMENT TOPICAL ONCE
OUTPATIENT
Start: 2025-01-15 | End: 2025-01-15

## 2025-01-15 RX ORDER — MUPIROCIN 20 MG/G
OINTMENT TOPICAL ONCE
OUTPATIENT
Start: 2025-01-15 | End: 2025-01-15

## 2025-01-15 RX ORDER — NEOMYCIN/BACITRACIN/POLYMYXINB 3.5-400-5K
OINTMENT (GRAM) TOPICAL ONCE
OUTPATIENT
Start: 2025-01-15 | End: 2025-01-15

## 2025-01-15 ASSESSMENT — PAIN SCALES - GENERAL: PAINLEVEL_OUTOF10: 0

## 2025-01-15 NOTE — PROGRESS NOTES
waveforms in the left calf are normal. PVR waveforms in the left ankle are normal. PVR waveforms of the left metatarsal region are normal. Right toe PPG is normal. Left toe PPG is normal.           Procedure:   Callus pared left plantar foot callus    -------  Wound 08/21/24 Ankle Anterior;Right;Lateral (Active)   Wound Image   01/15/25 1357   Wound Etiology Venous 01/15/25 1357   Dressing Status New dressing applied 01/15/25 1452   Wound Cleansed Cleansed with saline 01/15/25 1357   Dressing/Treatment Silicone border 01/15/25 1452   Offloading for Diabetic Foot Ulcers Offloading not ordered 01/15/25 1452   Wound Length (cm) 0.9 cm 01/15/25 1357   Wound Width (cm) 0.1 cm 01/15/25 1357   Wound Depth (cm) 0.1 cm 01/15/25 1357   Wound Surface Area (cm^2) 0.09 cm^2 01/15/25 1357   Change in Wound Size % (l*w) 85 01/15/25 1357   Wound Volume (cm^3) 0.009 cm^3 01/15/25 1357   Wound Healing % 93 01/15/25 1357   Post-Procedure Length (cm) 0.7 cm 11/20/24 1431   Post-Procedure Width (cm) 0.2 cm 11/20/24 1431   Post-Procedure Depth (cm) 0.3 cm 11/20/24 1431   Post-Procedure Surface Area (cm^2) 0.14 cm^2 11/20/24 1431   Post-Procedure Volume (cm^3) 0.042 cm^3 11/20/24 1431   Wound Assessment Dry 01/15/25 1357   Drainage Amount None (dry) 01/15/25 1357   Drainage Description Serosanguinous 11/20/24 1354   Odor None 01/15/25 1357   Mirian-wound Assessment Hyperpigmented 01/15/25 1357   Margins Undefined edges 01/15/25 1357   Wound Thickness Description not for Pressure Injury Full thickness 01/15/25 1357   Number of days: 147       Wound 08/21/24 Foot Dorsal;Right (Active)   Wound Image   01/15/25 1357   Wound Etiology Venous 01/15/25 1357   Dressing Status New dressing applied 01/15/25 1452   Wound Cleansed Vashe 12/18/24 1416   Dressing/Treatment Silicone border 01/15/25 1452   Offloading for Diabetic Foot Ulcers Offloading not ordered 01/15/25 1452   Wound Length (cm) 0.3 cm 01/15/25 1357   Wound Width (cm) 0.1 cm 01/15/25 1357

## 2025-01-15 NOTE — FLOWSHEET NOTE
01/15/25 1452   Wound 08/21/24 Ankle Anterior;Right;Lateral   Date First Assessed/Time First Assessed: 08/21/24 1434   Present on Original Admission: Yes  Wound Approximate Age at First Assessment (Weeks): 12 weeks  Primary Wound Type: Venous Ulcer  Location: Ankle  Wound Location Orientation: Anterior;Right;Lat...   Dressing Status New dressing applied   Dressing/Treatment Silicone border  (vaseline)   Offloading for Diabetic Foot Ulcers Offloading not ordered   Wound 08/21/24 Foot Dorsal;Right   Date First Assessed/Time First Assessed: 08/21/24 1436   Present on Original Admission: Yes  Wound Approximate Age at First Assessment (Weeks): 24 weeks  Primary Wound Type: Venous Ulcer  Location: Foot  Wound Location Orientation: Dorsal;Right   Dressing Status New dressing applied   Dressing/Treatment Silicone border  (vaseline)   Offloading for Diabetic Foot Ulcers Offloading not ordered       
Stated Pain Goal 0 - No pain     /75   Pulse 91   Temp 98.2 °F (36.8 °C) (Temporal)   Resp 16

## 2025-01-15 NOTE — PATIENT INSTRUCTIONS
Discharge Instructions for  [facility]  [address] [address]  Phone: [phone] Fax: [phone]    NAME:  Chanelle Clay  YOB: 1950  MEDICAL RECORD NUMBER:  [id number]  DATE:  January 15, 2025    WOUND CARE ORDERS:   Right ankle and foot wounds- Cleanse with baby shampoo. Allow to lather. Rinse and pat dry.  Apply vaseline (petroleum jelly) to wound bed. Cover with silicone border foam. Change after showers, 3-4 times a week. Do not get dressing wet.     Home Health ([facility]) - discontinued.       Activity:  [x] Elevate leg(s) above the level of the heart when sitting.  [x] Avoid prolonged standing in one place.   [x] Do no get dressing/wrap wet.       Dietary:  [x] Diet as tolerated      [] Diabetic Diet            [x] Increase Protein: examples (Meat, cheese, eggs, greek yogurt, fish, nuts)          [] Дмитрий Therapeutic Nutrition Powder  [] Other:       Return Appointment:  [x] Return Appointment: With Dr. Gill in 4 Weeks  [] Nurse Visit   [] Ordered tests:     Wound Care Center Information: Should you experience any significant changes in your wound(s) or have questions about your wound care, please contact the [facility] at MONDAY - FRIDAY 8:00 am - 4:30.  If you need help with your wound outside these hours and cannot wait until we are again available, contact your PCP or go to the hospital emergency room.     PLEASE NOTE: IF YOU ARE UNABLE TO OBTAIN WOUND SUPPLIES, CONTINUE TO USE THE SUPPLIES YOU HAVE AVAILABLE UNTIL YOU ARE ABLE TO REACH US. IT IS MOST IMPORTANT TO KEEP THE WOUND COVERED AT ALL TIMES.     Physician Signature:_______________________    Date: ___________ Time:  ____________

## 2025-02-26 ENCOUNTER — HOSPITAL ENCOUNTER (OUTPATIENT)
Facility: HOSPITAL | Age: 75
Discharge: HOME | End: 2025-02-26
Attending: FAMILY MEDICINE
Payer: MEDICARE

## 2025-02-26 VITALS
DIASTOLIC BLOOD PRESSURE: 70 MMHG | RESPIRATION RATE: 16 BRPM | HEART RATE: 89 BPM | TEMPERATURE: 98.1 F | SYSTOLIC BLOOD PRESSURE: 105 MMHG

## 2025-02-26 DIAGNOSIS — L97.312 ANKLE ULCER, RIGHT, WITH FAT LAYER EXPOSED (HCC): Primary | ICD-10-CM

## 2025-02-26 DIAGNOSIS — S51.001A ELBOW WOUND, RIGHT, INITIAL ENCOUNTER: ICD-10-CM

## 2025-02-26 DIAGNOSIS — L97.512 RIGHT FOOT ULCER, WITH FAT LAYER EXPOSED (HCC): ICD-10-CM

## 2025-02-26 DIAGNOSIS — S51.811A SKIN TEAR OF FOREARM WITHOUT COMPLICATION, RIGHT, INITIAL ENCOUNTER: ICD-10-CM

## 2025-02-26 PROCEDURE — 11042 DBRDMT SUBQ TIS 1ST 20SQCM/<: CPT

## 2025-02-26 RX ORDER — LIDOCAINE HYDROCHLORIDE 20 MG/ML
JELLY TOPICAL ONCE
OUTPATIENT
Start: 2025-02-26 | End: 2025-02-26

## 2025-02-26 RX ORDER — SODIUM CHLOR/HYPOCHLOROUS ACID 0.033 %
SOLUTION, IRRIGATION IRRIGATION ONCE
OUTPATIENT
Start: 2025-02-26 | End: 2025-02-26

## 2025-02-26 RX ORDER — NEOMYCIN/BACITRACIN/POLYMYXINB 3.5-400-5K
OINTMENT (GRAM) TOPICAL ONCE
OUTPATIENT
Start: 2025-02-26 | End: 2025-02-26

## 2025-02-26 RX ORDER — LIDOCAINE HYDROCHLORIDE 40 MG/ML
SOLUTION TOPICAL ONCE
OUTPATIENT
Start: 2025-02-26 | End: 2025-02-26

## 2025-02-26 RX ORDER — LIDOCAINE 50 MG/G
OINTMENT TOPICAL ONCE
OUTPATIENT
Start: 2025-02-26 | End: 2025-02-26

## 2025-02-26 RX ORDER — CLOBETASOL PROPIONATE 0.5 MG/G
OINTMENT TOPICAL ONCE
OUTPATIENT
Start: 2025-02-26 | End: 2025-02-26

## 2025-02-26 RX ORDER — BACITRACIN ZINC AND POLYMYXIN B SULFATE 500; 1000 [USP'U]/G; [USP'U]/G
OINTMENT TOPICAL ONCE
OUTPATIENT
Start: 2025-02-26 | End: 2025-02-26

## 2025-02-26 RX ORDER — GENTAMICIN SULFATE 1 MG/G
OINTMENT TOPICAL ONCE
OUTPATIENT
Start: 2025-02-26 | End: 2025-02-26

## 2025-02-26 RX ORDER — MUPIROCIN 20 MG/G
OINTMENT TOPICAL ONCE
OUTPATIENT
Start: 2025-02-26 | End: 2025-02-26

## 2025-02-26 RX ORDER — BETAMETHASONE DIPROPIONATE 0.5 MG/G
CREAM TOPICAL ONCE
OUTPATIENT
Start: 2025-02-26 | End: 2025-02-26

## 2025-02-26 RX ORDER — GINSENG 100 MG
CAPSULE ORAL ONCE
OUTPATIENT
Start: 2025-02-26 | End: 2025-02-26

## 2025-02-26 RX ORDER — LIDOCAINE 40 MG/G
CREAM TOPICAL ONCE
OUTPATIENT
Start: 2025-02-26 | End: 2025-02-26

## 2025-02-26 RX ORDER — TRIAMCINOLONE ACETONIDE 1 MG/G
OINTMENT TOPICAL ONCE
OUTPATIENT
Start: 2025-02-26 | End: 2025-02-26

## 2025-02-26 NOTE — FLOWSHEET NOTE
02/26/25 1435   RLE Neurovascular Assessment   Capillary Refill Less than/Equal to 3 seconds   Color Appropriate for Ethnicity   Temperature Warm   RLE Sensation  Full sensation   R Pedal Pulse +2   Wound 08/21/24 Ankle Anterior;Right;Lateral   Date First Assessed/Time First Assessed: 08/21/24 1434   Present on Original Admission: Yes  Wound Approximate Age at First Assessment (Weeks): 12 weeks  Primary Wound Type: Venous Ulcer  Location: Ankle  Wound Location Orientation: Anterior;Right;Lat...   Wound Image    Wound Etiology Venous   Dressing Status Intact;Dry   Wound Cleansed Cleansed with saline   Offloading for Diabetic Foot Ulcers Offloading not required   Wound Length (cm) 0.1 cm   Wound Width (cm) 0.1 cm   Wound Depth (cm) 0.1 cm   Wound Surface Area (cm^2) 0.01 cm^2   Change in Wound Size % (l*w) 98.33   Wound Volume (cm^3) 0.001 cm^3   Wound Healing % 99   Wound Assessment Dry;Epithelialization   Drainage Amount None (dry)   Odor None   Mirian-wound Assessment Fragile   Margins Undefined edges   Wound Thickness Description not for Pressure Injury Full thickness   Wound 08/21/24 Foot Dorsal;Right   Date First Assessed/Time First Assessed: 08/21/24 1436   Present on Original Admission: Yes  Wound Approximate Age at First Assessment (Weeks): 24 weeks  Primary Wound Type: Venous Ulcer  Location: Foot  Wound Location Orientation: Dorsal;Right   Wound Image    Wound Etiology Venous   Dressing Status Old drainage noted;New drainage noted;Intact   Wound Cleansed Cleansed with saline   Offloading for Diabetic Foot Ulcers Offloading not required   Wound Length (cm) 1.1 cm   Wound Width (cm) 0.7 cm   Wound Depth (cm) 0.2 cm   Wound Surface Area (cm^2) 0.77 cm^2   Change in Wound Size % (l*w) -541.67   Wound Volume (cm^3) 0.154 cm^3   Wound Healing % -1183   Wound Assessment [place]/red;Slough   Drainage Amount Moderate (25-50%)   Drainage Description Serosanguinous   Odor None   Mirian-wound Assessment Fragile;Maceration

## 2025-02-26 NOTE — PROGRESS NOTES
Wound Center  Progress Note / Procedure Note      Chief Complaint:  Chanelle Clay is a 74 y.o. female  with Right foot and ankle wound since February 2023. Here for follow up of 2 wounds and follow up for chronic callus .      Assessment/Plan     74 y.o. female with     -R ankle anterior chronic ulcer.  Re-opened  healed    -R foot anterior chronic ulcer.  Re-opened  Periwound discolored  Full thickness  Slough/granular  Necessitates debridement  for wound healing and to prevent/heal infection  Ulcer needs debridement- see note below        -Left plantar foot callus/?wart  Less pain  Pared+  Using compound W      2 NEW wounds    Right elbow  Full thickness  Slough/granular  Mechanically debrided    Right arm skin tear  Full thickness  Slough/granular  Mechanically debrided    Leg swelling  Elevate  Compression   venous reflux test normal        -Nicotine dependence  Aware of Effects of nicotine on wound healing-   Has cut back from 1.5ppd to 10 cig/day      -Vascular  Normal ABIs/TBIs/ in April 2023          Following discussed with patient / sister  Needs :  Serial debridement- prn    Good local wound care     Dressing:   blastx to foot wound, layered Xeroform to arm wounds  Frequency : 3x/week        Restart h Home Health       Patient/  understood and agrees with plan. Questions answered.    Follow up 2 week    TIME:  total time for today's E/M service used for level of service is documented below.    This time includes physician non-face-to-face service time visit on the date of service and includes    Preparing to see the patient (eg, review of tests)  Obtaining and/or reviewing separately obtained history  Performing a medically necessary appropriate examination and/or evaluation  Counseling and educating the patient/family/caregiver  Ordering medications, tests, or procedures  Referring and communicating with other health care professionals as needed  Documenting clinical information in the electronic or

## 2025-02-26 NOTE — FLOWSHEET NOTE
02/26/25 1506   Wound 08/21/24 Foot Dorsal;Right   Date First Assessed/Time First Assessed: 08/21/24 1436   Present on Original Admission: Yes  Wound Approximate Age at First Assessment (Weeks): 24 weeks  Primary Wound Type: Venous Ulcer  Location: Foot  Wound Location Orientation: Dorsal;Right   Dressing Status New dressing applied   Dressing/Treatment Xeroform;Silicone border   Offloading for Diabetic Foot Ulcers Offloading not required   Post-Procedure Length (cm) 1.1 cm   Post-Procedure Width (cm) 0.7 cm   Post-Procedure Depth (cm) 0.4 cm   Post-Procedure Surface Area (cm^2) 0.77 cm^2   Post-Procedure Volume (cm^3) 0.308 cm^3   Wound 02/26/25 Arm Right;Upper   Date First Assessed/Time First Assessed: 02/26/25 1521   Present on Original Admission: Yes  Wound Approximate Age at First Assessment (Weeks): 1 weeks  Primary Wound Type: Skin Tear  Location: Arm  Wound Location Orientation: Right;Upper   Wound Image    Wound Etiology Skin Tear   Dressing Status New dressing applied   Dressing/Treatment Xeroform;Silicone border   Wound Length (cm) 1.5 cm   Wound Width (cm) 2.3 cm   Wound Depth (cm) 0.1 cm   Wound Surface Area (cm^2) 3.45 cm^2   Wound Volume (cm^3) 0.345 cm^3   Wound Assessment [place]/red;Bleeding   Drainage Amount Moderate (25-50%)   Drainage Description Sanguinous   Odor None   Mirian-wound Assessment Blanchable erythema;Fragile   Margins Defined edges   Wound Thickness Description not for Pressure Injury Full thickness   Wound 02/26/25 Elbow Right   Date First Assessed/Time First Assessed: 02/26/25 1523   Present on Original Admission: No  Wound Approximate Age at First Assessment (Weeks): 2 weeks  Primary Wound Type: Skin Tear  Location: Elbow  Wound Location Orientation: Right   Wound Image    Wound Etiology Traumatic   Dressing Status New dressing applied   Dressing/Treatment Xeroform;Silicone border   Wound Length (cm) 0.3 cm   Wound Width (cm) 1.2 cm   Wound Depth (cm) 0.1 cm   Wound Surface Area

## 2025-02-26 NOTE — PATIENT INSTRUCTIONS
Discharge Instructions for  [facility]  [address] [address]  Phone: [phone] Fax: [phone]    NAME:  Chanelle Clay  YOB: 1950  MEDICAL RECORD NUMBER:  [id number]  DATE:  February 26, 2025    WOUND CARE ORDERS:   Right ankle wound-Cleanse with baby shampoo. Allow to lather. Rinse and pat dry.  Apply vaseline (petroleum jelly) to wound area three times a week. Leave open to air. Okay if gets covered by silicone border foam from dorsal foot wound.   Right dorsal foot wound-Cleanse with baby shampoo. Allow to lather. Rinse and pat dry. In clinic apply to wound. Cover with silicone border foam. Remove for showers. Change after showers, three times a week.  Right arm and elbow wound-Cleanse with baby shampoo. Allow to lather. Rinse and pat dry. Apply 2 layers of Xeroform to wound. Secure with silicone border foam.Remove for showers. Change after showers, three times a week.    Referral to home health ([facility]) for skilled nursing.   Activity:  [x] Elevate leg(s) above the level of the heart when sitting.  [x] Avoid prolonged standing in one place.   [x] Do no get dressing/wrap wet.       Dietary:  [x] Diet as tolerated      [] Diabetic Diet            [x] Increase Protein: examples (Meat, cheese, eggs, greek yogurt, fish, nuts)          [] Дмитрий Therapeutic Nutrition Powder  [] Other:       Return Appointment:  [x] Return Appointment: With Dr. Gill in 2 Weeks  [] Nurse Visit   [] Ordered tests:     Wound Care Center Information: Should you experience any significant changes in your wound(s) or have questions about your wound care, please contact the [facility] at MONDAY - FRIDAY 8:00 am - 4:30.  If you need help with your wound outside these hours and cannot wait until we are again available, contact your PCP or go to the hospital emergency room.     PLEASE NOTE: IF YOU ARE UNABLE TO OBTAIN WOUND SUPPLIES, CONTINUE TO USE THE

## 2025-03-12 ENCOUNTER — HOSPITAL ENCOUNTER (OUTPATIENT)
Facility: HOSPITAL | Age: 75
Discharge: HOME | End: 2025-03-12
Attending: FAMILY MEDICINE
Payer: MEDICARE

## 2025-03-12 VITALS
RESPIRATION RATE: 16 BRPM | TEMPERATURE: 97.7 F | SYSTOLIC BLOOD PRESSURE: 117 MMHG | HEART RATE: 96 BPM | DIASTOLIC BLOOD PRESSURE: 77 MMHG

## 2025-03-12 DIAGNOSIS — L97.512 RIGHT FOOT ULCER, WITH FAT LAYER EXPOSED (HCC): ICD-10-CM

## 2025-03-12 DIAGNOSIS — L97.312 ANKLE ULCER, RIGHT, WITH FAT LAYER EXPOSED (HCC): Primary | ICD-10-CM

## 2025-03-12 DIAGNOSIS — S51.001A ELBOW WOUND, RIGHT, INITIAL ENCOUNTER: ICD-10-CM

## 2025-03-12 DIAGNOSIS — S51.811A SKIN TEAR OF FOREARM WITHOUT COMPLICATION, RIGHT, INITIAL ENCOUNTER: ICD-10-CM

## 2025-03-12 PROCEDURE — 11042 DBRDMT SUBQ TIS 1ST 20SQCM/<: CPT

## 2025-03-12 RX ORDER — GENTAMICIN SULFATE 1 MG/G
OINTMENT TOPICAL ONCE
OUTPATIENT
Start: 2025-03-12 | End: 2025-03-12

## 2025-03-12 RX ORDER — MUPIROCIN 20 MG/G
OINTMENT TOPICAL ONCE
OUTPATIENT
Start: 2025-03-12 | End: 2025-03-12

## 2025-03-12 RX ORDER — TRIAMCINOLONE ACETONIDE 1 MG/G
OINTMENT TOPICAL ONCE
OUTPATIENT
Start: 2025-03-12 | End: 2025-03-12

## 2025-03-12 RX ORDER — GINSENG 100 MG
CAPSULE ORAL ONCE
OUTPATIENT
Start: 2025-03-12 | End: 2025-03-12

## 2025-03-12 RX ORDER — BETAMETHASONE DIPROPIONATE 0.5 MG/G
CREAM TOPICAL ONCE
OUTPATIENT
Start: 2025-03-12 | End: 2025-03-12

## 2025-03-12 RX ORDER — LIDOCAINE HYDROCHLORIDE 40 MG/ML
SOLUTION TOPICAL ONCE
OUTPATIENT
Start: 2025-03-12 | End: 2025-03-12

## 2025-03-12 RX ORDER — LIDOCAINE 40 MG/G
CREAM TOPICAL ONCE
OUTPATIENT
Start: 2025-03-12 | End: 2025-03-12

## 2025-03-12 RX ORDER — LIDOCAINE 50 MG/G
OINTMENT TOPICAL ONCE
OUTPATIENT
Start: 2025-03-12 | End: 2025-03-12

## 2025-03-12 RX ORDER — BACITRACIN ZINC AND POLYMYXIN B SULFATE 500; 1000 [USP'U]/G; [USP'U]/G
OINTMENT TOPICAL ONCE
OUTPATIENT
Start: 2025-03-12 | End: 2025-03-12

## 2025-03-12 RX ORDER — LIDOCAINE HYDROCHLORIDE 20 MG/ML
JELLY TOPICAL ONCE
OUTPATIENT
Start: 2025-03-12 | End: 2025-03-12

## 2025-03-12 RX ORDER — NEOMYCIN/BACITRACIN/POLYMYXINB 3.5-400-5K
OINTMENT (GRAM) TOPICAL ONCE
OUTPATIENT
Start: 2025-03-12 | End: 2025-03-12

## 2025-03-12 RX ORDER — SODIUM CHLOR/HYPOCHLOROUS ACID 0.033 %
SOLUTION, IRRIGATION IRRIGATION ONCE
OUTPATIENT
Start: 2025-03-12 | End: 2025-03-12

## 2025-03-12 RX ORDER — CLOBETASOL PROPIONATE 0.5 MG/G
OINTMENT TOPICAL ONCE
OUTPATIENT
Start: 2025-03-12 | End: 2025-03-12

## 2025-03-12 ASSESSMENT — PAIN SCALES - GENERAL: PAINLEVEL_OUTOF10: 0

## 2025-03-12 NOTE — FLOWSHEET NOTE
03/12/25 1408   Anesthetic   Anesthetic 4% Lidocaine Liquid Topical   RLE Neurovascular Assessment   Capillary Refill Less than/Equal to 3 seconds   Color Appropriate for Ethnicity   Temperature Warm   RLE Sensation  Full sensation   R Pedal Pulse +2   Wound 02/26/25 Arm Right;Upper   Date First Assessed/Time First Assessed: 02/26/25 1521   Present on Original Admission: Yes  Wound Approximate Age at First Assessment (Weeks): 1 weeks  Primary Wound Type: Skin Tear  Location: Arm  Wound Location Orientation: Right;Upper   Wound Image    Wound Etiology Skin Tear   Dressing Status New dressing applied   Dressing/Treatment Xeroform;Silicone border   Wound Length (cm) 0.1 cm   Wound Width (cm) 0.1 cm   Wound Depth (cm) 0.1 cm   Wound Surface Area (cm^2) 0.01 cm^2   Change in Wound Size % (l*w) 99.71   Wound Volume (cm^3) 0.001 cm^3   Wound Healing % 100   Wound Assessment Epithelialization   Drainage Amount None (dry)   Wound 08/21/24 Foot Dorsal;Right   Date First Assessed/Time First Assessed: 08/21/24 1436   Present on Original Admission: Yes  Wound Approximate Age at First Assessment (Weeks): 24 weeks  Primary Wound Type: Venous Ulcer  Location: Foot  Wound Location Orientation: Dorsal;Right   Wound Image    Wound Etiology Venous   Dressing Status Old drainage noted   Wound Cleansed Cleansed with saline   Offloading for Diabetic Foot Ulcers Offloading not required   Wound Length (cm) 0.6 cm   Wound Width (cm) 0.5 cm   Wound Depth (cm) 0.2 cm   Wound Surface Area (cm^2) 0.3 cm^2   Change in Wound Size % (l*w) -150   Wound Volume (cm^3) 0.06 cm^3   Wound Healing % -400   Wound Assessment Pink/red;Dry   Drainage Amount Moderate (25-50%)   Drainage Description Serosanguinous   Odor None   Mirian-wound Assessment Hemosiderin staining (brown yellow);Hypopigmented   Margins Defined edges   Wound Thickness Description not for Pressure Injury Full thickness   Wound 08/21/24 Ankle Anterior;Right;Lateral   Date First

## 2025-03-12 NOTE — PATIENT INSTRUCTIONS
Discharge Instructions for  [facility]  [address] [address]  Phone: [phone] Fax: [phone]    NAME:  Chanelle Clay  YOB: 1950  MEDICAL RECORD NUMBER:  [id number]  DATE:  March 12, 2025    Home Health-[facility]   WOUND CARE ORDERS:   Right ankle wound-Cleanse with baby shampoo. Allow to lather. Rinse and pat dry.  Apply vaseline (petroleum jelly) to wound area three times a week. Leave open to air. Okay if gets covered by silicone border foam from dorsal foot wound.   Right dorsal foot wound-Cleanse with baby shampoo. Allow to lather. Rinse and pat dry. Apply one drop of timolol to wound. Then apply blastx to wound. Cover with silicone border foam. Remove for showers. Change after showers, three times a week.  Right arm and elbow wound-Moisturize with vaseline (petroleum jelly).   Left lateral ankle-Apply bandaid.     Activity:  [x] Elevate leg(s) above the level of the heart when sitting.  [x] Avoid prolonged standing in one place.   [x] Do no get dressing/wrap wet.       Dietary:  [x] Diet as tolerated      [] Diabetic Diet            [x] Increase Protein: examples (Meat, cheese, eggs, greek yogurt, fish, nuts)          [] Дмитрий Therapeutic Nutrition Powder  [] Other:       Return Appointment:  [x] Return Appointment: With Dr. Gill in 3 Weeks  [] Nurse Visit   [] Ordered tests:     Wound Care Center Information: Should you experience any significant changes in your wound(s) or have questions about your wound care, please contact the [facility] at MONDAY - FRIDAY 8:00 am - 4:30.  If you need help with your wound outside these hours and cannot wait until we are again available, contact your PCP or go to the hospital emergency room.     PLEASE NOTE: IF YOU ARE UNABLE TO OBTAIN WOUND SUPPLIES, CONTINUE TO USE THE SUPPLIES YOU HAVE AVAILABLE UNTIL YOU ARE ABLE TO REACH US. IT IS MOST IMPORTANT TO KEEP THE WOUND

## 2025-03-12 NOTE — PROGRESS NOTES
Wound Center  Progress Note / Procedure Note      Chief Complaint:  Chanelle Clay is a 74 y.o. female  with Right foot and ankle wound since February 2023. Here for follow up of 2 wounds and follow up for chronic callus .      Assessment/Plan     74 y.o. female with     -R ankle anterior chronic ulcer.  Re-opened  Remains healed    -R foot anterior chronic ulcer.  Re-opened  Periwound discolored  Full thickness  Slough/granular  Necessitates debridement  for wound healing and to prevent/heal infection  Ulcer needs debridement- see note below        -Left plantar foot callus/?wart  Less pain  Pared+  Using compound W  Improving    Right elbow  Full thickness  Healed    Right arm skin tear  Full thickness  Healed    Leg swelling  Elevate  Compression   venous reflux test normal        -Nicotine dependence  Aware of Effects of nicotine on wound healing-   Has cut back from 1.5ppd to 10 cig/day      -Vascular  Normal ABIs/TBIs/ in April 2023          Following discussed with patient / sister  Needs :  Serial debridement- prn    Good local wound care     Dressing: Timolol, blastx to foot wound,  Frequency : 3x/week        Restart h Home Health       Patient/  understood and agrees with plan. Questions answered.    Follow up 3 week          Subjective:   Since last visit  3/12 no new wounds.    2/26 right dorsal foot wound has reopened.  Also left plantar foot callus..  Also has 2 new wounds on the right arm both mechanically debrided . orders home health    12/18 no issues    10/23 saw Dr ALONSO; timolol added.  timolol burnt on freshly debrided wound, otherwise does ok    10/2 frustrated/upset, wound not healing    9/18 wound care going well  Not happy with one of the Home health nurses, but working with agency    9/4  HH started, only been out once      8/21  Patient returns today for same wounds  Last seen 3/6 when wound s had healed  Opened up few weeks ago  Very tender    Has been wearing compression socks

## 2025-03-12 NOTE — FLOWSHEET NOTE
03/12/25 1453   Wound 08/21/24 Foot Dorsal;Right   Date First Assessed/Time First Assessed: 08/21/24 1436   Present on Original Admission: Yes  Wound Approximate Age at First Assessment (Weeks): 24 weeks  Primary Wound Type: Venous Ulcer  Location: Foot  Wound Location Orientation: Dorsal;Right   Wound Etiology Venous   Dressing Status New dressing applied   Dressing/Treatment Silicone border  (Blastex)   Offloading for Diabetic Foot Ulcers Offloading not required   Post-Procedure Length (cm) 0.6 cm   Post-Procedure Width (cm) 0.5 cm   Post-Procedure Depth (cm) 0.3 cm   Post-Procedure Surface Area (cm^2) 0.3 cm^2   Post-Procedure Volume (cm^3) 0.09 cm^3   Wound 08/21/24 Ankle Anterior;Right;Lateral   Date First Assessed/Time First Assessed: 08/21/24 1434   Present on Original Admission: Yes  Wound Approximate Age at First Assessment (Weeks): 12 weeks  Primary Wound Type: Venous Ulcer  Location: Ankle  Wound Location Orientation: Anterior;Right;Lat...   Wound Etiology Venous   Dressing Status New dressing applied   Dressing/Treatment   (Vaseline)   Offloading for Diabetic Foot Ulcers Offloading not required     Discharge Condition: Stable    Pain: 0    Ambulatory Status: None    Discharge Destination: home    Transportation:car    Accompanied by: SELF    Discharge instructions reviewed with SELF and copy or written instructions have been provided. All questions/concerns have been addressed at this time.

## 2025-03-21 ENCOUNTER — TELEPHONE (OUTPATIENT)
Facility: HOSPITAL | Age: 75
End: 2025-03-21

## 2025-03-21 DIAGNOSIS — S51.001A ELBOW WOUND, RIGHT, INITIAL ENCOUNTER: ICD-10-CM

## 2025-03-21 DIAGNOSIS — L97.512 RIGHT FOOT ULCER, WITH FAT LAYER EXPOSED (HCC): ICD-10-CM

## 2025-03-21 DIAGNOSIS — S51.811A SKIN TEAR OF FOREARM WITHOUT COMPLICATION, RIGHT, INITIAL ENCOUNTER: ICD-10-CM

## 2025-03-21 DIAGNOSIS — L97.312 ANKLE ULCER, RIGHT, WITH FAT LAYER EXPOSED (HCC): Primary | ICD-10-CM

## 2025-03-21 NOTE — TELEPHONE ENCOUNTER
MARLENI from [facility] called unit stating patient has new skin tear on arm. This RN contacted Dr. Mo (Dr. Gill out of [facility]) and rec'd JACQUE for xeroform, gauze, roll gauze to wound. MARLENI instructed on new orders. Patient to follow up at next scheduled appointment.

## 2025-04-02 ENCOUNTER — HOSPITAL ENCOUNTER (OUTPATIENT)
Facility: HOSPITAL | Age: 75
Discharge: HOME | End: 2025-04-02
Attending: FAMILY MEDICINE
Payer: MEDICARE

## 2025-04-02 VITALS
TEMPERATURE: 97.7 F | SYSTOLIC BLOOD PRESSURE: 110 MMHG | RESPIRATION RATE: 18 BRPM | DIASTOLIC BLOOD PRESSURE: 72 MMHG | HEART RATE: 76 BPM

## 2025-04-02 DIAGNOSIS — L97.312 ANKLE ULCER, RIGHT, WITH FAT LAYER EXPOSED (HCC): Primary | ICD-10-CM

## 2025-04-02 DIAGNOSIS — L97.512 RIGHT FOOT ULCER, WITH FAT LAYER EXPOSED (HCC): ICD-10-CM

## 2025-04-02 DIAGNOSIS — S51.811A SKIN TEAR OF FOREARM WITHOUT COMPLICATION, RIGHT, INITIAL ENCOUNTER: ICD-10-CM

## 2025-04-02 DIAGNOSIS — S51.001A ELBOW WOUND, RIGHT, INITIAL ENCOUNTER: ICD-10-CM

## 2025-04-02 PROCEDURE — 11042 DBRDMT SUBQ TIS 1ST 20SQCM/<: CPT

## 2025-04-02 RX ORDER — LIDOCAINE HYDROCHLORIDE 40 MG/ML
SOLUTION TOPICAL ONCE
OUTPATIENT
Start: 2025-04-02 | End: 2025-04-02

## 2025-04-02 RX ORDER — NEOMYCIN/BACITRACIN/POLYMYXINB 3.5-400-5K
OINTMENT (GRAM) TOPICAL ONCE
OUTPATIENT
Start: 2025-04-02 | End: 2025-04-02

## 2025-04-02 RX ORDER — TRIAMCINOLONE ACETONIDE 1 MG/G
OINTMENT TOPICAL ONCE
OUTPATIENT
Start: 2025-04-02 | End: 2025-04-02

## 2025-04-02 RX ORDER — BETAMETHASONE DIPROPIONATE 0.5 MG/G
CREAM TOPICAL ONCE
OUTPATIENT
Start: 2025-04-02 | End: 2025-04-02

## 2025-04-02 RX ORDER — GENTAMICIN SULFATE 1 MG/G
OINTMENT TOPICAL ONCE
OUTPATIENT
Start: 2025-04-02 | End: 2025-04-02

## 2025-04-02 RX ORDER — BACITRACIN ZINC AND POLYMYXIN B SULFATE 500; 1000 [USP'U]/G; [USP'U]/G
OINTMENT TOPICAL ONCE
OUTPATIENT
Start: 2025-04-02 | End: 2025-04-02

## 2025-04-02 RX ORDER — MUPIROCIN 20 MG/G
OINTMENT TOPICAL ONCE
OUTPATIENT
Start: 2025-04-02 | End: 2025-04-02

## 2025-04-02 RX ORDER — LIDOCAINE HYDROCHLORIDE 20 MG/ML
JELLY TOPICAL ONCE
OUTPATIENT
Start: 2025-04-02 | End: 2025-04-02

## 2025-04-02 RX ORDER — SODIUM CHLOR/HYPOCHLOROUS ACID 0.033 %
SOLUTION, IRRIGATION IRRIGATION ONCE
OUTPATIENT
Start: 2025-04-02 | End: 2025-04-02

## 2025-04-02 RX ORDER — CLOBETASOL PROPIONATE 0.5 MG/G
OINTMENT TOPICAL ONCE
OUTPATIENT
Start: 2025-04-02 | End: 2025-04-02

## 2025-04-02 RX ORDER — LIDOCAINE 50 MG/G
OINTMENT TOPICAL ONCE
OUTPATIENT
Start: 2025-04-02 | End: 2025-04-02

## 2025-04-02 RX ORDER — LIDOCAINE 40 MG/G
CREAM TOPICAL ONCE
OUTPATIENT
Start: 2025-04-02 | End: 2025-04-02

## 2025-04-02 RX ORDER — GINSENG 100 MG
CAPSULE ORAL ONCE
OUTPATIENT
Start: 2025-04-02 | End: 2025-04-02

## 2025-04-02 NOTE — FLOWSHEET NOTE
04/02/25 1526   Wound 04/02/25 Ankle Left;Lateral   Date First Assessed/Time First Assessed: 04/02/25 1450   Present on Original Admission: Yes  Wound Approximate Age at First Assessment (Weeks): 2 weeks  Location: Ankle  Wound Location Orientation: Left;Lateral   Dressing Status New dressing applied   Dressing/Treatment Silicone border  (Blastex)   Offloading for Diabetic Foot Ulcers Offloading not required   Wound 04/02/25 Elbow Posterior;Right   Date First Assessed/Time First Assessed: 04/02/25 1451   Present on Original Admission: Yes  Wound Approximate Age at First Assessment (Weeks): 2 weeks  Location: Elbow  Wound Location Orientation: Posterior;Right   Wound Etiology Traumatic   Dressing Status New dressing applied   Dressing/Treatment Xeroform;Roll gauze   Offloading for Diabetic Foot Ulcers Offloading not required   Wound 08/21/24 Foot Dorsal;Right   Date First Assessed/Time First Assessed: 08/21/24 1436   Present on Original Admission: Yes  Wound Approximate Age at First Assessment (Weeks): 24 weeks  Primary Wound Type: Venous Ulcer  Location: Foot  Wound Location Orientation: Dorsal;Right   Wound Etiology Venous   Dressing Status New dressing applied   Dressing/Treatment Silicone border  (Timolol, blastex)   Offloading for Diabetic Foot Ulcers Offloading not required   Post-Procedure Length (cm) 6 cm   Post-Procedure Width (cm) 0.4 cm   Post-Procedure Depth (cm) 0.3 cm   Post-Procedure Surface Area (cm^2) 2.4 cm^2   Post-Procedure Volume (cm^3) 0.72 cm^3     Discharge Condition: Stable    Pain: 0    Ambulatory Status: None    Discharge Destination: home    Transportation:Car    Accompanied by: SELF    Discharge instructions reviewed with SELF and copy or written instructions have been provided. All questions/concerns have been addressed at this time.

## 2025-04-02 NOTE — FLOWSHEET NOTE
04/02/25 1449   Anesthetic   Anesthetic 4% Lidocaine Liquid Topical   Peripheral Vascular   RLE Edema None   RLE Neurovascular Assessment   Capillary Refill Less than/Equal to 3 seconds   Color Appropriate for Ethnicity   Temperature Warm   RLE Sensation  Full sensation   R Pedal Pulse +2   Wound 04/02/25 Ankle Left;Lateral   Date First Assessed/Time First Assessed: 04/02/25 1450   Present on Original Admission: Yes  Wound Approximate Age at First Assessment (Weeks): 2 weeks  Location: Ankle  Wound Location Orientation: Left;Lateral   Wound Image    Dressing Status Old drainage noted   Wound Cleansed Cleansed with saline   Offloading for Diabetic Foot Ulcers Offloading not required   Wound Length (cm) 1.5 cm   Wound Width (cm) 0.7 cm   Wound Depth (cm) 0.1 cm   Wound Surface Area (cm^2) 1.05 cm^2   Wound Volume (cm^3) 0.105 cm^3   Wound Assessment Pink/red;Eschar dry   Drainage Amount Moderate (25-50%)   Drainage Description Serosanguinous   Odor None   Mirian-wound Assessment Blanchable erythema   Margins Undefined edges   Wound Thickness Description not for Pressure Injury Partial thickness   Wound 04/02/25 Elbow Posterior;Right   Date First Assessed/Time First Assessed: 04/02/25 1451   Present on Original Admission: Yes  Wound Approximate Age at First Assessment (Weeks): 2 weeks  Location: Elbow  Wound Location Orientation: Posterior;Right   Wound Image    Wound Etiology Traumatic   Dressing Status Old drainage noted   Wound Cleansed Cleansed with saline   Offloading for Diabetic Foot Ulcers Offloading not required   Wound Length (cm) 3.5 cm   Wound Width (cm) 2.5 cm   Wound Depth (cm) 0.1 cm   Wound Surface Area (cm^2) 8.75 cm^2   Wound Volume (cm^3) 0.875 cm^3   Wound Assessment Pink/red;Superficial   Drainage Amount Scant (moist but unmeasurable)   Drainage Description Serosanguinous   Odor None   Mirian-wound Assessment Fragile;Dry/flaky;Blanchable erythema   Margins Flat/open edges   Wound Thickness

## 2025-04-02 NOTE — PATIENT INSTRUCTIONS
Discharge Instructions for  [facility]  [address] [address]  Phone: [phone] Fax: [phone]    NAME:  Chanelle Clay  YOB: 1950  MEDICAL RECORD NUMBER:  [id number]  DATE:  April 2, 2025    Home Health-[facility]   WOUND CARE ORDERS:                                     All wounds-Cleanse with baby shampoo. Allow to lather. Rinse and pat dry  Right ankle wound-Apply vaseline (petroleum jelly) to wound area three times a week. Leave open to air. Okay if gets covered by silicone border foam from dorsal foot wound.   Right dorsal foot wound-Apply one drop of timolol to wound. Then apply blastx to wound. Cover with silicone border foam. Remove for showers. Change after showers, three times a week.  Right elbow wound-Apply Xeroform to wound. Cover with roll gauze. Secure with tape.   Left lateral ankle wound-Apply BlastX to wound. Cover and secure with silicone border foam.   Bilateral plantar feet- Use liquid Compound W wart remover. Leave open to air.    Remove dressings  for showers. Change after showers, three times a week.  Activity:  [x] Elevate leg(s) above the level of the heart when sitting.  [x] Avoid prolonged standing in one place.   [x] Do no get dressing/wrap wet.       Dietary:  [x] Diet as tolerated      [] Diabetic Diet            [x] Increase Protein: examples (Meat, cheese, eggs, greek yogurt, fish, nuts)          [] Дмитрий Therapeutic Nutrition Powder  [] Other:       Return Appointment:  [x] Return Appointment: With Dr. Gill in 2 Weeks  [] Nurse Visit   [] Ordered tests:     Wound Care Center Information: Should you experience any significant changes in your wound(s) or have questions about your wound care, please contact the [facility] MONDAY - THURSDAY 8:00AM - 4:00PM and FRIDAY 8:00AM - NOON at the number listed above. If you need help with your wound outside these hours and cannot wait until we are

## 2025-04-16 ENCOUNTER — HOSPITAL ENCOUNTER (OUTPATIENT)
Facility: HOSPITAL | Age: 75
Discharge: HOME | End: 2025-04-16
Attending: FAMILY MEDICINE
Payer: MEDICARE

## 2025-04-16 VITALS
DIASTOLIC BLOOD PRESSURE: 71 MMHG | HEART RATE: 94 BPM | RESPIRATION RATE: 18 BRPM | SYSTOLIC BLOOD PRESSURE: 112 MMHG | TEMPERATURE: 97.8 F

## 2025-04-16 DIAGNOSIS — L97.322 ANKLE ULCER, LEFT, WITH FAT LAYER EXPOSED (HCC): ICD-10-CM

## 2025-04-16 DIAGNOSIS — L97.512 RIGHT FOOT ULCER, WITH FAT LAYER EXPOSED (HCC): ICD-10-CM

## 2025-04-16 DIAGNOSIS — S51.811A SKIN TEAR OF FOREARM WITHOUT COMPLICATION, RIGHT, INITIAL ENCOUNTER: ICD-10-CM

## 2025-04-16 DIAGNOSIS — L97.312 ANKLE ULCER, RIGHT, WITH FAT LAYER EXPOSED (HCC): Primary | ICD-10-CM

## 2025-04-16 DIAGNOSIS — S51.001A ELBOW WOUND, RIGHT, INITIAL ENCOUNTER: ICD-10-CM

## 2025-04-16 PROCEDURE — 87070 CULTURE OTHR SPECIMN AEROBIC: CPT

## 2025-04-16 PROCEDURE — 99213 OFFICE O/P EST LOW 20 MIN: CPT

## 2025-04-16 PROCEDURE — 87186 SC STD MICRODIL/AGAR DIL: CPT

## 2025-04-16 PROCEDURE — 87205 SMEAR GRAM STAIN: CPT

## 2025-04-16 PROCEDURE — 87077 CULTURE AEROBIC IDENTIFY: CPT

## 2025-04-16 RX ORDER — GENTAMICIN SULFATE 1 MG/G
OINTMENT TOPICAL ONCE
Status: COMPLETED | OUTPATIENT
Start: 2025-04-16 | End: 2025-04-16

## 2025-04-16 RX ORDER — BETAMETHASONE DIPROPIONATE 0.5 MG/G
CREAM TOPICAL ONCE
OUTPATIENT
Start: 2025-04-16 | End: 2025-04-16

## 2025-04-16 RX ORDER — CLOBETASOL PROPIONATE 0.5 MG/G
OINTMENT TOPICAL ONCE
OUTPATIENT
Start: 2025-04-16 | End: 2025-04-16

## 2025-04-16 RX ORDER — SODIUM CHLOR/HYPOCHLOROUS ACID 0.033 %
SOLUTION, IRRIGATION IRRIGATION ONCE
OUTPATIENT
Start: 2025-04-16 | End: 2025-04-16

## 2025-04-16 RX ORDER — DOXYCYCLINE HYCLATE 100 MG
100 TABLET ORAL 2 TIMES DAILY
Qty: 14 TABLET | Refills: 0 | Status: SHIPPED | OUTPATIENT
Start: 2025-04-16 | End: 2025-04-23

## 2025-04-16 RX ORDER — LIDOCAINE HYDROCHLORIDE 20 MG/ML
JELLY TOPICAL ONCE
OUTPATIENT
Start: 2025-04-16 | End: 2025-04-16

## 2025-04-16 RX ORDER — TRIAMCINOLONE ACETONIDE 1 MG/G
OINTMENT TOPICAL ONCE
OUTPATIENT
Start: 2025-04-16 | End: 2025-04-16

## 2025-04-16 RX ORDER — MUPIROCIN 20 MG/G
OINTMENT TOPICAL ONCE
OUTPATIENT
Start: 2025-04-16 | End: 2025-04-16

## 2025-04-16 RX ORDER — LIDOCAINE HYDROCHLORIDE 40 MG/ML
SOLUTION TOPICAL ONCE
OUTPATIENT
Start: 2025-04-16 | End: 2025-04-16

## 2025-04-16 RX ORDER — LIDOCAINE 50 MG/G
OINTMENT TOPICAL ONCE
OUTPATIENT
Start: 2025-04-16 | End: 2025-04-16

## 2025-04-16 RX ORDER — BACITRACIN ZINC AND POLYMYXIN B SULFATE 500; 1000 [USP'U]/G; [USP'U]/G
OINTMENT TOPICAL ONCE
OUTPATIENT
Start: 2025-04-16 | End: 2025-04-16

## 2025-04-16 RX ORDER — GENTAMICIN SULFATE 1 MG/G
OINTMENT TOPICAL
Qty: 30 G | Refills: 0 | Status: SHIPPED | OUTPATIENT
Start: 2025-04-16 | End: 2025-05-07

## 2025-04-16 RX ORDER — GINSENG 100 MG
CAPSULE ORAL ONCE
OUTPATIENT
Start: 2025-04-16 | End: 2025-04-16

## 2025-04-16 RX ORDER — NEOMYCIN/BACITRACIN/POLYMYXINB 3.5-400-5K
OINTMENT (GRAM) TOPICAL ONCE
OUTPATIENT
Start: 2025-04-16 | End: 2025-04-16

## 2025-04-16 RX ORDER — LIDOCAINE 40 MG/G
CREAM TOPICAL ONCE
OUTPATIENT
Start: 2025-04-16 | End: 2025-04-16

## 2025-04-16 RX ORDER — GENTAMICIN SULFATE 1 MG/G
OINTMENT TOPICAL ONCE
OUTPATIENT
Start: 2025-04-16 | End: 2025-04-16

## 2025-04-16 RX ADMIN — GENTAMICIN SULFATE: 1 OINTMENT TOPICAL at 14:02

## 2025-04-16 ASSESSMENT — PAIN DESCRIPTION - DESCRIPTORS: DESCRIPTORS: BURNING

## 2025-04-16 ASSESSMENT — PAIN SCALES - GENERAL: PAINLEVEL_OUTOF10: 7

## 2025-04-16 ASSESSMENT — PAIN DESCRIPTION - ORIENTATION: ORIENTATION: RIGHT

## 2025-04-16 ASSESSMENT — PAIN DESCRIPTION - LOCATION: LOCATION: ANKLE

## 2025-04-16 NOTE — PATIENT INSTRUCTIONS
Discharge Instructions for  [facility]  [address] [address]  Phone: [phone] Fax: [phone]    NAME:  Chanelle Clay  YOB: 1950  MEDICAL RECORD NUMBER:  [id number]  DATE:  April 16, 2025    Home Health-[facility]   WOUND CARE ORDERS:                                     All wounds-Cleanse with baby shampoo. Allow to lather. Rinse and pat dry  Right dorsal foot wound- Apply blastx to wound. Cover with silicone border foam. Remove for showers. Change after showers, three times a week.  Left lateral ankle wound-Apply gentamicin ointment to wound. Cover and secure with silicone border foam (zetuvit border okay to use).Remove for showers. Change daily.   Bilateral plantar feet- Use liquid Compound W wart remover. Leave open to air.      Activity:  [x] Elevate leg(s) above the level of the heart when sitting.  [x] Avoid prolonged standing in one place.   [x] Do no get dressing/wrap wet.       Dietary:  [x] Diet as tolerated      [] Diabetic Diet            [x] Increase Protein: examples (Meat, cheese, eggs, greek yogurt, fish, nuts)          [] Дмитрий Therapeutic Nutrition Powder  [] Other:       Return Appointment:  [x] Return Appointment: With Dr. Gill in 1 Week  [] Nurse Visit   [] Ordered tests:     Wound Care Center Information: Should you experience any significant changes in your wound(s) or have questions about your wound care, please contact the [facility] MONDAY - THURSDAY 8:00AM - 4:00PM and FRIDAY 8:00AM - NOON at the number listed above. If you need help with your wound outside these hours and cannot wait until we are again available, contact your PCP or go to the hospital emergency room.      PLEASE NOTE: IF YOU ARE UNABLE TO OBTAIN WOUND SUPPLIES, CONTINUE TO USE THE SUPPLIES YOU HAVE AVAILABLE UNTIL YOU ARE ABLE TO REACH US. IT IS MOST IMPORTANT TO KEEP THE WOUND COVERED AT ALL TIMES.     Physician

## 2025-04-16 NOTE — FLOWSHEET NOTE
04/16/25 1328   Anesthetic   Anesthetic 4% Lidocaine Liquid Topical   RLE Neurovascular Assessment   Capillary Refill Less than/Equal to 3 seconds   Color Appropriate for Ethnicity   Temperature Warm   RLE Sensation  Full sensation   R Pedal Pulse +2   Wound 04/02/25 Ankle Left;Lateral   Date First Assessed/Time First Assessed: 04/02/25 1450   Present on Original Admission: Yes  Wound Approximate Age at First Assessment (Weeks): 2 weeks  Location: Ankle  Wound Location Orientation: Left;Lateral   Wound Image    Wound Etiology Other   Dressing Status Old drainage noted   Wound Cleansed Cleansed with saline   Offloading for Diabetic Foot Ulcers Offloading not required   Wound Length (cm) 1 cm   Wound Width (cm) 1.2 cm   Wound Depth (cm) 0.1 cm   Wound Surface Area (cm^2) 1.2 cm^2   Change in Wound Size % (l*w) -14.29   Wound Volume (cm^3) 0.12 cm^3   Wound Healing % -14   Wound Assessment Pink/red;Slough;Superficial   Drainage Amount Moderate (25-50%)   Drainage Description Serosanguinous   Odor None   Mirian-wound Assessment Hyperpigmented;Fragile   Margins Defined edges;Flat/open edges   Wound Thickness Description not for Pressure Injury Partial thickness   Wound 08/21/24 Foot Dorsal;Right   Date First Assessed/Time First Assessed: 08/21/24 1436   Present on Original Admission: Yes  Wound Approximate Age at First Assessment (Weeks): 24 weeks  Primary Wound Type: Venous Ulcer  Location: Foot  Wound Location Orientation: Dorsal;Right   Wound Image    Wound Etiology Venous   Dressing Status Intact   Offloading for Diabetic Foot Ulcers Offloading not required   Wound Length (cm) 0.1 cm   Wound Width (cm) 0 cm   Wound Depth (cm) 0.1 cm   Wound Surface Area (cm^2) 0 cm^2   Change in Wound Size % (l*w) 100   Wound Volume (cm^3) 0 cm^3   Wound Healing % 100   Wound Assessment Epithelialization   Drainage Amount None (dry)   Odor None   Wound 04/02/25 Elbow Posterior;Right   Date First Assessed/Time First Assessed:

## 2025-04-16 NOTE — PROGRESS NOTES
Wound Center  Progress Note / Procedure Note      Chief Complaint:  Chanelle Clay is a 74 y.o. female  with Right foot and ankle wound since February 2023. Here for follow up of 2 wounds and follow up for chronic callus .      Assessment/Plan     74 y.o. female with     -R ankle anterior chronic ulcer.  Re-opened  Remains healed    -R foot anterior chronic ulcer.  Periwound discolored  Full thickness  Smaller, healing well    Left ankle  Full thickness  Slough/granular  Tender++  Culture done    Leg swelling  Elevate  Compression   venous reflux test normal        -Nicotine dependence  Aware of Effects of nicotine on wound healing-   Has cut back from 1.5ppd to 10 cig/day      -Vascular  Normal ABIs/TBIs/ in April 2023          Following discussed with patient / sister  Needs :  Serial debridement- prn    Good local wound care     Dressing:  blastx to right foot wound,3x/week  Gentamicin to left ankle wound once daily        Restart h Home Health       Patient/  understood and agrees with plan. Questions answered.    Follow up 1 week          Subjective:   Since last visit  4/16 right foot is doing fine, now left ankle hurting and returning a lot , both timolol and blastx make it worse.  Picked up lidocaine spray over-the-counter but takes too long and not very effective.  Also taking ibuprofen 3 times a day.    3/12 no new wounds.    2/26 right dorsal foot wound has reopened.  Also left plantar foot callus..  Also has 2 new wounds on the right arm both mechanically debrided . orders home health    12/18 no issues    10/23 saw Dr ALONSO; timolol added.  timolol burnt on freshly debrided wound, otherwise does ok    10/2 frustrated/upset, wound not healing    9/18 wound care going well  Not happy with one of the Home health nurses, but working with agency    9/4  HH started, only been out once      8/21  Patient returns today for same wounds  Last seen 3/6 when wound s had healed  Opened up few weeks ago  Very

## 2025-04-16 NOTE — FLOWSHEET NOTE
04/16/25 1411   Wound 04/02/25 Ankle Left;Lateral   Date First Assessed/Time First Assessed: 04/02/25 1450   Present on Original Admission: Yes  Wound Approximate Age at First Assessment (Weeks): 2 weeks  Location: Ankle  Wound Location Orientation: Left;Lateral   Dressing Status New dressing applied   Dressing/Treatment   (gentamicin, border foam)   Offloading for Diabetic Foot Ulcers Offloading not required   Wound 08/21/24 Foot Dorsal;Right   Date First Assessed/Time First Assessed: 08/21/24 1436   Present on Original Admission: Yes  Wound Approximate Age at First Assessment (Weeks): 24 weeks  Primary Wound Type: Venous Ulcer  Location: Foot  Wound Location Orientation: Dorsal;Right   Dressing Status New dressing applied   Dressing/Treatment   (blast x border foam)   Offloading for Diabetic Foot Ulcers Offloading not required

## 2025-04-19 LAB
BACTERIA SPEC CULT: ABNORMAL
GRAM STN SPEC: ABNORMAL
GRAM STN SPEC: ABNORMAL
SERVICE CMNT-IMP: ABNORMAL

## 2025-04-21 ENCOUNTER — TELEPHONE (OUTPATIENT)
Facility: HOSPITAL | Age: 75
End: 2025-04-21

## 2025-04-21 DIAGNOSIS — S51.811A SKIN TEAR OF FOREARM WITHOUT COMPLICATION, RIGHT, INITIAL ENCOUNTER: ICD-10-CM

## 2025-04-21 DIAGNOSIS — S51.001A ELBOW WOUND, RIGHT, INITIAL ENCOUNTER: ICD-10-CM

## 2025-04-21 DIAGNOSIS — L97.512 RIGHT FOOT ULCER, WITH FAT LAYER EXPOSED (HCC): ICD-10-CM

## 2025-04-21 DIAGNOSIS — L97.312 ANKLE ULCER, RIGHT, WITH FAT LAYER EXPOSED (HCC): Primary | ICD-10-CM

## 2025-04-21 RX ORDER — CIPROFLOXACIN 500 MG/1
500 TABLET, FILM COATED ORAL 2 TIMES DAILY
Qty: 28 TABLET | Refills: 0 | Status: SHIPPED | OUTPATIENT
Start: 2025-04-21 | End: 2025-05-05

## 2025-04-21 NOTE — PROGRESS NOTES
Wound Center- wound culture    Culture results reviewed:      Allergies:   Sulfa, PCN, morphine      eGFR:   >60 /has   one kidney)    Organisms:  MODERATE STAPHYLOCOCCUS PSEUDOINTERMEDIUS     Sensitive to:  cipro    E-scribed to pharmacy:  Cipro 500mg twice daily for 14 days    Stop taking doxycycline    Nurse notified patient/CG and made aware of the following Side effects:      Gut issues - N/V, loose stools, etc  Muscle pain, very low risk of tendon rupture        Eli Gill MD  04/21/25

## 2025-04-21 NOTE — TELEPHONE ENCOUNTER
Spoke with patient re switching abx from doxycycline to Cipro per Dr. Gill's instruction. Verbalizes understanding.  verified.

## 2025-04-22 ENCOUNTER — TELEPHONE (OUTPATIENT)
Facility: HOSPITAL | Age: 75
End: 2025-04-22

## 2025-04-22 DIAGNOSIS — L97.512 RIGHT FOOT ULCER, WITH FAT LAYER EXPOSED (HCC): ICD-10-CM

## 2025-04-22 DIAGNOSIS — S51.811A SKIN TEAR OF FOREARM WITHOUT COMPLICATION, RIGHT, INITIAL ENCOUNTER: ICD-10-CM

## 2025-04-22 DIAGNOSIS — L97.312 ANKLE ULCER, RIGHT, WITH FAT LAYER EXPOSED (HCC): Primary | ICD-10-CM

## 2025-04-22 DIAGNOSIS — S51.001A ELBOW WOUND, RIGHT, INITIAL ENCOUNTER: ICD-10-CM

## 2025-04-22 NOTE — TELEPHONE ENCOUNTER
Spoke with patient re message from  nurse with re-opened right leg ulcer. Dr. Gill wants to see patient tomorrow. Instructed patient, verbalizes agreement. States is not tolerating Cipro. Will contact Dr. Gill.  verbalized.

## 2025-04-22 NOTE — TELEPHONE ENCOUNTER
Spoke with patient re taking 1/2 Cipro per Dr. Gill. States will not be taking. Questions about IV abx; instructed will need to discuss with Dr. Gill at her appointment tomorrow. Verbalizes understanding.

## 2025-04-23 ENCOUNTER — HOSPITAL ENCOUNTER (OUTPATIENT)
Facility: HOSPITAL | Age: 75
Discharge: HOME | End: 2025-04-23
Attending: FAMILY MEDICINE
Payer: MEDICARE

## 2025-04-23 VITALS
TEMPERATURE: 97 F | SYSTOLIC BLOOD PRESSURE: 93 MMHG | HEART RATE: 99 BPM | RESPIRATION RATE: 18 BRPM | DIASTOLIC BLOOD PRESSURE: 63 MMHG

## 2025-04-23 DIAGNOSIS — L97.512 RIGHT FOOT ULCER, WITH FAT LAYER EXPOSED (HCC): ICD-10-CM

## 2025-04-23 DIAGNOSIS — L97.312 ANKLE ULCER, RIGHT, WITH FAT LAYER EXPOSED (HCC): Primary | ICD-10-CM

## 2025-04-23 DIAGNOSIS — S51.811A SKIN TEAR OF FOREARM WITHOUT COMPLICATION, RIGHT, INITIAL ENCOUNTER: ICD-10-CM

## 2025-04-23 DIAGNOSIS — S51.001A ELBOW WOUND, RIGHT, INITIAL ENCOUNTER: ICD-10-CM

## 2025-04-23 PROCEDURE — 99213 OFFICE O/P EST LOW 20 MIN: CPT

## 2025-04-23 RX ORDER — GENTAMICIN SULFATE 1 MG/G
OINTMENT TOPICAL ONCE
OUTPATIENT
Start: 2025-04-23 | End: 2025-04-23

## 2025-04-23 RX ORDER — NEOMYCIN/BACITRACIN/POLYMYXINB 3.5-400-5K
OINTMENT (GRAM) TOPICAL ONCE
OUTPATIENT
Start: 2025-04-23 | End: 2025-04-23

## 2025-04-23 RX ORDER — LIDOCAINE 40 MG/G
CREAM TOPICAL ONCE
OUTPATIENT
Start: 2025-04-23 | End: 2025-04-23

## 2025-04-23 RX ORDER — LIDOCAINE 50 MG/G
OINTMENT TOPICAL ONCE
OUTPATIENT
Start: 2025-04-23 | End: 2025-04-23

## 2025-04-23 RX ORDER — BACITRACIN ZINC AND POLYMYXIN B SULFATE 500; 1000 [USP'U]/G; [USP'U]/G
OINTMENT TOPICAL ONCE
OUTPATIENT
Start: 2025-04-23 | End: 2025-04-23

## 2025-04-23 RX ORDER — GENTAMICIN SULFATE 1 MG/G
OINTMENT TOPICAL ONCE
Status: COMPLETED | OUTPATIENT
Start: 2025-04-23 | End: 2025-04-23

## 2025-04-23 RX ORDER — SODIUM CHLOR/HYPOCHLOROUS ACID 0.033 %
SOLUTION, IRRIGATION IRRIGATION ONCE
OUTPATIENT
Start: 2025-04-23 | End: 2025-04-23

## 2025-04-23 RX ORDER — BETAMETHASONE DIPROPIONATE 0.5 MG/G
CREAM TOPICAL ONCE
OUTPATIENT
Start: 2025-04-23 | End: 2025-04-23

## 2025-04-23 RX ORDER — LIDOCAINE HYDROCHLORIDE 20 MG/ML
JELLY TOPICAL ONCE
OUTPATIENT
Start: 2025-04-23 | End: 2025-04-23

## 2025-04-23 RX ORDER — TRIAMCINOLONE ACETONIDE 1 MG/G
OINTMENT TOPICAL ONCE
OUTPATIENT
Start: 2025-04-23 | End: 2025-04-23

## 2025-04-23 RX ORDER — MUPIROCIN 20 MG/G
OINTMENT TOPICAL ONCE
OUTPATIENT
Start: 2025-04-23 | End: 2025-04-23

## 2025-04-23 RX ORDER — GINSENG 100 MG
CAPSULE ORAL ONCE
OUTPATIENT
Start: 2025-04-23 | End: 2025-04-23

## 2025-04-23 RX ORDER — LIDOCAINE HYDROCHLORIDE 40 MG/ML
SOLUTION TOPICAL ONCE
OUTPATIENT
Start: 2025-04-23 | End: 2025-04-23

## 2025-04-23 RX ORDER — CLOBETASOL PROPIONATE 0.5 MG/G
OINTMENT TOPICAL ONCE
OUTPATIENT
Start: 2025-04-23 | End: 2025-04-23

## 2025-04-23 RX ADMIN — GENTAMICIN SULFATE: 1 OINTMENT TOPICAL at 15:22

## 2025-04-23 ASSESSMENT — PAIN SCALES - GENERAL: PAINLEVEL_OUTOF10: 0

## 2025-04-23 NOTE — FLOWSHEET NOTE
04/23/25 1528   Wound 04/02/25 Ankle Left;Lateral   Date First Assessed/Time First Assessed: 04/02/25 1450   Present on Original Admission: Yes  Wound Approximate Age at First Assessment (Weeks): 2 weeks  Location: Ankle  Wound Location Orientation: Left;Lateral   Dressing Status New dressing applied   Dressing/Treatment   (gentamicin, border foam)   Offloading for Diabetic Foot Ulcers Offloading not required   Wound 08/21/24 Foot Dorsal;Right   Date First Assessed/Time First Assessed: 08/21/24 1436   Present on Original Admission: Yes  Wound Approximate Age at First Assessment (Weeks): 24 weeks  Primary Wound Type: Venous Ulcer  Location: Foot  Wound Location Orientation: Dorsal;Right   Dressing Status New dressing applied   Dressing/Treatment   (blast X border foam)   Offloading for Diabetic Foot Ulcers Offloading not required

## 2025-04-23 NOTE — PATIENT INSTRUCTIONS
Discharge Instructions for  [facility]  [address] [address]  Phone: [phone] Fax: [phone]    NAME:  Chanelle Clay  YOB: 1950  MEDICAL RECORD NUMBER:  [id number]  DATE:  April 23, 2025    Home Health-[facility]   WOUND CARE ORDERS:                                     All wounds-Cleanse with baby shampoo. Allow to lather. Rinse and pat dry  Right dorsal foot and anterior ankle wounds- Apply blastx to wound. Cover with silicone border foam. Remove for showers. Change after showers. Change three times a week.  Left lateral ankle wound-Apply gentamicin ointment to wound. Cover and secure with silicone border foam (zetuvit border okay to use). Remove for showers. Change after showers. Change daily.   Bilateral plantar feet- Use liquid Compound W wart remover. Leave open to air.      If increased pain, drainage, redness, fever, chills please go to the Emergency Department for evaluation.   Activity:  [x] Elevate leg(s) above the level of the heart when sitting.  [x] Avoid prolonged standing in one place.   [x] Do no get dressing/wrap wet.       Dietary:  [x] Diet as tolerated      [] Diabetic Diet            [x] Increase Protein: examples (Meat, cheese, eggs, greek yogurt, fish, nuts)          [] Дмитрий Therapeutic Nutrition Powder  [] Other:    Return Appointment:  [x] Return Appointment: With Dr. Gill in 1 Week  [] Nurse Visit   [] Ordered tests:     Wound Care Center Information: Should you experience any significant changes in your wound(s) or have questions about your wound care, please contact the [facility] MONDAY - THURSDAY 8:00AM - 4:00PM and FRIDAY 8:00AM - NOON at the number listed above. If you need help with your wound outside these hours and cannot wait until we are again available, contact your PCP or go to the hospital emergency room.      PLEASE NOTE: IF YOU ARE UNABLE TO OBTAIN WOUND SUPPLIES, CONTINUE

## 2025-04-23 NOTE — PROGRESS NOTES
Wound Center  Progress Note / Procedure Note      Chief Complaint:  Chanelle Clay is a 74 y.o. female  with Right foot and ankle wound since February 2023. Here for follow up of 2 wounds and follow up for chronic callus .      Assessment/Plan     74 y.o. female with     -R ankle anterior chronic ulcer.  Re-opened  Re-opened  Resume using blastx    -R foot anterior chronic ulcer.  Periwound discolored  Full thickness  Smaller, healing well  Continue blastx    Left ankle  Full thickness  Slough/granular  Much much smaller than last visit, periwound also looks healthier  Culture reviewed with patient-only oral choices I have his floroquinolone and Bactrim.  Wound is also resistant to gentamicin, so the topical gentamicin has also not effective against this particular bacteria.  Patient is allergic to Bactrim.  Discussed options:  1-As wound is getting better, it is one third of the size it was last week, suggest watching and seeing how the wound does, especially given pain is improved and there are no signs of systemic or local infection.  2-Other options include going to the ER to get started on IV antibiotics  3- Or referring to ID for eval and starting on IV antibiotics  For now patient has chosen to watch and wait, however she will think about it further with her sister.  Mr. Felix is ready    Leg swelling  Elevate  Compression   venous reflux test normal        -Nicotine dependence  Aware of Effects of nicotine on wound healing-   Has cut back from 1.5ppd to 10 cig/day      -Vascular  Normal ABIs/TBIs/ in April 2023          Following discussed with patient / sister  Needs :  Serial debridement- prn    Good local wound care     Dressing:  blastx to right foot wounds,3x/week  Gentamicin to left ankle wound once daily        Today Home Health       Patient/  understood and agrees with plan. Questions answered.    Follow up 1 week      TIME:  total time for today's E/M service used for level of service is

## 2025-04-23 NOTE — FLOWSHEET NOTE
04/23/25 1421   Anesthetic   Anesthetic 4% Lidocaine Liquid Topical   RLE Neurovascular Assessment   Capillary Refill Less than/Equal to 3 seconds   Color Appropriate for Ethnicity   Temperature Warm   RLE Sensation  Full sensation   R Pedal Pulse +2   Wound 04/02/25 Ankle Left;Lateral   Date First Assessed/Time First Assessed: 04/02/25 1450   Present on Original Admission: Yes  Wound Approximate Age at First Assessment (Weeks): 2 weeks  Location: Ankle  Wound Location Orientation: Left;Lateral   Wound Image    Wound Etiology Other   Dressing Status Old drainage noted   Wound Cleansed Cleansed with saline   Offloading for Diabetic Foot Ulcers Offloading not required   Wound Length (cm) 0.6 cm   Wound Width (cm) 0.5 cm   Wound Depth (cm) 0.1 cm   Wound Surface Area (cm^2) 0.3 cm^2   Change in Wound Size % (l*w) 71.43   Wound Volume (cm^3) 0.03 cm^3   Wound Healing % 71   Wound Assessment Pink/red;Slough;Superficial   Drainage Amount Small (< 25%)   Drainage Description Serosanguinous   Odor None   Mirian-wound Assessment Hyperpigmented;Maceration   Margins Defined edges   Wound Thickness Description not for Pressure Injury Full thickness   Wound 08/21/24 Foot Dorsal;Right   Date First Assessed/Time First Assessed: 08/21/24 1436   Present on Original Admission: Yes  Wound Approximate Age at First Assessment (Weeks): 24 weeks  Primary Wound Type: Venous Ulcer  Location: Foot  Wound Location Orientation: Dorsal;Right   Wound Image    Wound Etiology Venous   Dressing Status Old drainage noted   Offloading for Diabetic Foot Ulcers Offloading not required   Wound Length (cm) 0.1 cm   Wound Width (cm) 0.1 cm   Wound Depth (cm) 0.1 cm   Wound Surface Area (cm^2) 0.01 cm^2   Change in Wound Size % (l*w) 91.67   Wound Volume (cm^3) 0.001 cm^3   Wound Healing % 92   Wound Assessment Epithelialization   Drainage Amount None (dry)   Odor None     BP 93/63   Pulse 99   Temp 97 °F (36.1 °C) (Temporal)   Resp 18

## 2025-04-24 ENCOUNTER — TELEPHONE (OUTPATIENT)
Facility: HOSPITAL | Age: 75
End: 2025-04-24

## 2025-04-24 DIAGNOSIS — L97.312 ANKLE ULCER, RIGHT, WITH FAT LAYER EXPOSED (HCC): Primary | ICD-10-CM

## 2025-04-24 DIAGNOSIS — S51.001A ELBOW WOUND, RIGHT, INITIAL ENCOUNTER: ICD-10-CM

## 2025-04-24 DIAGNOSIS — S51.811A SKIN TEAR OF FOREARM WITHOUT COMPLICATION, RIGHT, INITIAL ENCOUNTER: ICD-10-CM

## 2025-04-24 DIAGNOSIS — L97.512 RIGHT FOOT ULCER, WITH FAT LAYER EXPOSED (HCC): ICD-10-CM

## 2025-04-24 NOTE — TELEPHONE ENCOUNTER
Instructed patient that per Dr. Gill she can go to the ER for IV abx treatment or she can make an appointment with an Infectious Disease MD. Patient states will go to ED.

## 2025-04-24 NOTE — TELEPHONE ENCOUNTER
RC to patient. Patient states that she is unclear on plan for antibiotic treatment of \"staph infection.\" Reports that she was allergic to medication (Cipro) first ordered, would like to know if anything else would be ordered. Would like to have staff message MD.

## 2025-04-25 ENCOUNTER — TELEPHONE (OUTPATIENT)
Facility: HOSPITAL | Age: 75
End: 2025-04-25

## 2025-04-25 ENCOUNTER — HOSPITAL ENCOUNTER (EMERGENCY)
Facility: HOSPITAL | Age: 75
Discharge: HOME | End: 2025-04-25
Attending: EMERGENCY MEDICINE
Payer: MEDICARE

## 2025-04-25 ENCOUNTER — APPOINTMENT (OUTPATIENT)
Facility: HOSPITAL | Age: 75
End: 2025-04-25
Payer: MEDICARE

## 2025-04-25 VITALS
HEIGHT: 56 IN | RESPIRATION RATE: 16 BRPM | WEIGHT: 124.12 LBS | BODY MASS INDEX: 27.92 KG/M2 | OXYGEN SATURATION: 98 % | DIASTOLIC BLOOD PRESSURE: 63 MMHG | SYSTOLIC BLOOD PRESSURE: 91 MMHG | HEART RATE: 70 BPM | TEMPERATURE: 98.1 F

## 2025-04-25 DIAGNOSIS — L97.512 RIGHT FOOT ULCER, WITH FAT LAYER EXPOSED (HCC): ICD-10-CM

## 2025-04-25 DIAGNOSIS — S51.811A SKIN TEAR OF FOREARM WITHOUT COMPLICATION, RIGHT, INITIAL ENCOUNTER: ICD-10-CM

## 2025-04-25 DIAGNOSIS — S51.001A ELBOW WOUND, RIGHT, INITIAL ENCOUNTER: ICD-10-CM

## 2025-04-25 DIAGNOSIS — L97.312 ANKLE ULCER, RIGHT, WITH FAT LAYER EXPOSED (HCC): Primary | ICD-10-CM

## 2025-04-25 DIAGNOSIS — S91.002A ANKLE WOUND, LEFT, INITIAL ENCOUNTER: Primary | ICD-10-CM

## 2025-04-25 LAB
ALBUMIN SERPL-MCNC: 3.8 G/DL (ref 3.5–5)
ALBUMIN/GLOB SERPL: 1.2 (ref 1.1–2.2)
ALP SERPL-CCNC: 120 U/L (ref 45–117)
ALT SERPL-CCNC: 35 U/L (ref 12–78)
ANION GAP SERPL CALC-SCNC: 4 MMOL/L (ref 2–12)
AST SERPL-CCNC: 26 U/L (ref 15–37)
BASOPHILS # BLD: 0.07 K/UL (ref 0–0.1)
BASOPHILS NFR BLD: 1 % (ref 0–1)
BILIRUB SERPL-MCNC: 1 MG/DL (ref 0.2–1)
BUN SERPL-MCNC: 22 MG/DL (ref 6–20)
BUN/CREAT SERPL: 21 (ref 12–20)
CALCIUM SERPL-MCNC: 9.2 MG/DL (ref 8.5–10.1)
CHLORIDE SERPL-SCNC: 104 MMOL/L (ref 97–108)
CO2 SERPL-SCNC: 27 MMOL/L (ref 21–32)
COMMENT:: NORMAL
CREAT SERPL-MCNC: 1.03 MG/DL (ref 0.55–1.02)
CRP SERPL-MCNC: <0.29 MG/DL (ref 0–0.3)
DIFFERENTIAL METHOD BLD: ABNORMAL
EOSINOPHIL # BLD: 0.16 K/UL (ref 0–0.4)
EOSINOPHIL NFR BLD: 2.3 % (ref 0–7)
ERYTHROCYTE [DISTWIDTH] IN BLOOD BY AUTOMATED COUNT: 13.1 % (ref 11.5–14.5)
ERYTHROCYTE [SEDIMENTATION RATE] IN BLOOD: 3 MM/HR (ref 0–30)
GLOBULIN SER CALC-MCNC: 3.1 G/DL (ref 2–4)
GLUCOSE SERPL-MCNC: 93 MG/DL (ref 65–100)
HCT VFR BLD AUTO: 38.6 % (ref 35–47)
HGB BLD-MCNC: 13.2 G/DL (ref 11.5–16)
IMM GRANULOCYTES # BLD AUTO: 0.02 K/UL (ref 0–0.04)
IMM GRANULOCYTES NFR BLD AUTO: 0.3 % (ref 0–0.5)
LACTATE SERPL-SCNC: 1.2 MMOL/L (ref 0.4–2)
LYMPHOCYTES # BLD: 1.76 K/UL (ref 0.8–3.5)
LYMPHOCYTES NFR BLD: 25.4 % (ref 12–49)
MCH RBC QN AUTO: 32 PG (ref 26–34)
MCHC RBC AUTO-ENTMCNC: 34.2 G/DL (ref 30–36.5)
MCV RBC AUTO: 93.7 FL (ref 80–99)
MONOCYTES # BLD: 0.4 K/UL (ref 0–1)
MONOCYTES NFR BLD: 5.8 % (ref 5–13)
NEUTS SEG # BLD: 4.53 K/UL (ref 1.8–8)
NEUTS SEG NFR BLD: 65.2 % (ref 32–75)
NRBC # BLD: 0 K/UL (ref 0–0.01)
NRBC BLD-RTO: 0 PER 100 WBC
PLATELET # BLD AUTO: 322 K/UL (ref 150–400)
PMV BLD AUTO: 8.8 FL (ref 8.9–12.9)
POTASSIUM SERPL-SCNC: 3.4 MMOL/L (ref 3.5–5.1)
PROT SERPL-MCNC: 6.9 G/DL (ref 6.4–8.2)
RBC # BLD AUTO: 4.12 M/UL (ref 3.8–5.2)
SODIUM SERPL-SCNC: 135 MMOL/L (ref 136–145)
SPECIMEN HOLD: NORMAL
WBC # BLD AUTO: 6.9 K/UL (ref 3.6–11)

## 2025-04-25 PROCEDURE — 85652 RBC SED RATE AUTOMATED: CPT

## 2025-04-25 PROCEDURE — 73610 X-RAY EXAM OF ANKLE: CPT

## 2025-04-25 PROCEDURE — 87040 BLOOD CULTURE FOR BACTERIA: CPT

## 2025-04-25 PROCEDURE — 80053 COMPREHEN METABOLIC PANEL: CPT

## 2025-04-25 PROCEDURE — 99284 EMERGENCY DEPT VISIT MOD MDM: CPT

## 2025-04-25 PROCEDURE — 86140 C-REACTIVE PROTEIN: CPT

## 2025-04-25 PROCEDURE — 85025 COMPLETE CBC W/AUTO DIFF WBC: CPT

## 2025-04-25 PROCEDURE — 83605 ASSAY OF LACTIC ACID: CPT

## 2025-04-25 RX ORDER — DOXYCYCLINE HYCLATE 100 MG
100 TABLET ORAL 2 TIMES DAILY
Qty: 14 TABLET | Refills: 0 | Status: SHIPPED | OUTPATIENT
Start: 2025-04-25 | End: 2025-05-02

## 2025-04-25 ASSESSMENT — PAIN DESCRIPTION - DESCRIPTORS
DESCRIPTORS: ACHING;DISCOMFORT;SORE
DESCRIPTORS: ACHING;DISCOMFORT

## 2025-04-25 ASSESSMENT — PAIN SCALES - GENERAL
PAINLEVEL_OUTOF10: 0
PAINLEVEL_OUTOF10: 8
PAINLEVEL_OUTOF10: 8

## 2025-04-25 ASSESSMENT — PAIN DESCRIPTION - FREQUENCY
FREQUENCY: CONTINUOUS
FREQUENCY: CONTINUOUS

## 2025-04-25 ASSESSMENT — PAIN DESCRIPTION - PAIN TYPE
TYPE: ACUTE PAIN
TYPE: ACUTE PAIN

## 2025-04-25 ASSESSMENT — PAIN - FUNCTIONAL ASSESSMENT
PAIN_FUNCTIONAL_ASSESSMENT: ACTIVITIES ARE NOT PREVENTED
PAIN_FUNCTIONAL_ASSESSMENT: PREVENTS OR INTERFERES WITH MANY ACTIVE NOT PASSIVE ACTIVITIES
PAIN_FUNCTIONAL_ASSESSMENT: 0-10

## 2025-04-25 ASSESSMENT — PAIN DESCRIPTION - ORIENTATION
ORIENTATION: LEFT
ORIENTATION: LEFT

## 2025-04-25 ASSESSMENT — PAIN DESCRIPTION - ONSET
ONSET: ON-GOING
ONSET: ON-GOING

## 2025-04-25 ASSESSMENT — PAIN DESCRIPTION - LOCATION
LOCATION: FOOT;ANKLE
LOCATION: FOOT;ANKLE

## 2025-04-25 NOTE — ED PROVIDER NOTES
[facility] [place] EMERGENCY DEPARTMENT  EMERGENCY DEPARTMENT ENCOUNTER      Patient Name: Chanelle Clay  MRN: [id number]  Birthdate 1950  Date of Evaluation: 4/25/2025  Physician: Bogdan Mane MD    CHIEF COMPLAINT       Chief Complaint   Patient presents with    Referral - General       HISTORY OF PRESENT ILLNESS   (Location/Symptom, Timing/Onset, Context/Setting, Quality, Duration, Modifying Factors, Severity)   Chanelle Clay, 74 y.o., female     74-year-old female with a history of lower extremity wounds presents with a chief complaint of a wound.  Patient has been battling a wound on her left ankle for some time now.  She has following with wound care.  She has been on doxycycline and Cipro.  She has not been tolerating the antibiotics at home due to side effects.  She was referred to the emergency department by her wound care physician.  She denies having any fevers          Nursing Notes were reviewed.    REVIEW OF SYSTEMS    (Not required)   Review of Systems    Except as noted above the remainder of the review of systems was reviewed and negative.     PAST MEDICAL HISTORY     Past Medical History:   Diagnosis Date    High cholesterol     Macular degeneration     Thyroid disease        SURGICAL HISTORY       Past Surgical History:   Procedure Laterality Date    COLONOSCOPY N/A 10/7/2020    COLONOSCOPY performed by César King MD at [facility] ENDOSCOPY    HYSTERECTOMY (CERVIX STATUS UNKNOWN)      ORTHOPEDIC SURGERY      carpal tunnel        CURRENT MEDICATIONS       Previous Medications    ALBUTEROL SULFATE HFA (VENTOLIN HFA) 108 (90 BASE) MCG/ACT INHALER    Inhale 2 puffs into the lungs every 6 hours as needed for Wheezing    BIOTIN 5000 MCG CAPS    Take 1 Dose by mouth    CETIRIZINE (ZYRTEC) 10 MG TABLET    Take 1 tablet by mouth daily    CHOLECALCIFEROL (VITAMIN D3) 125 MCG (5000 UT) TABS    Take 1 tablet by mouth    CIPROFLOXACIN (CIPRO) 500 MG TABLET    Take 1 tablet by mouth 2 times daily for 14 days

## 2025-04-25 NOTE — ED NOTES
Went over discharge paperwork with pt. Pt verbalized understanding. Pt wheeled out of ED without incident

## 2025-04-25 NOTE — ED PROVIDER NOTES
74-year-old female signed out to me by Dr. Mane pending results of her labs.  Please see previous notes for details.    Labs are grossly reassuring.  No white count.  No inflammatory marker elevation.  On clinical exam the wound does not look overtly infected.  She grew out Staphylococcus pseudo intermedius on her wound culture which is cirilo for dogs.  Per literature review this is very similar and is pathogenesis to staff aureus and I think doxycycline would be an appropriate antibiotic to put her on.  She was discharged in stable condition     Bryan Fischer MD  04/25/25 9936

## 2025-04-25 NOTE — TELEPHONE ENCOUNTER
RN returned call to patient as requested. Patient was very upset that MD did not tell her to go get IV antibiotics at her appointment. Reeducated patient on instructions from visit as wound was progressing nicely and MD stated unless there is increased pain, redness, swelling, etc to then go get IV antibiotics. Patient verbalized understanding and stated that she would go to either [facility] or [facility] ER.

## 2025-04-25 NOTE — ED TRIAGE NOTES
Patient arrives to the ED as a referral from her wound doctor for complaints of L ankle wound. Patient was unable to tolerate PO antibiotics and was sent here to have IV antibiotics.

## 2025-04-27 LAB
BACTERIA SPEC CULT: NORMAL
SERVICE CMNT-IMP: NORMAL

## 2025-04-30 LAB
BACTERIA SPEC CULT: NORMAL
SERVICE CMNT-IMP: NORMAL

## 2025-05-14 ENCOUNTER — HOSPITAL ENCOUNTER (OUTPATIENT)
Facility: HOSPITAL | Age: 75
Discharge: HOME | End: 2025-05-14
Attending: FAMILY MEDICINE
Payer: MEDICARE

## 2025-05-14 VITALS
SYSTOLIC BLOOD PRESSURE: 86 MMHG | RESPIRATION RATE: 16 BRPM | HEART RATE: 64 BPM | DIASTOLIC BLOOD PRESSURE: 61 MMHG | TEMPERATURE: 97.4 F

## 2025-05-14 DIAGNOSIS — S51.001A ELBOW WOUND, RIGHT, INITIAL ENCOUNTER: ICD-10-CM

## 2025-05-14 DIAGNOSIS — L97.512 RIGHT FOOT ULCER, WITH FAT LAYER EXPOSED (HCC): ICD-10-CM

## 2025-05-14 DIAGNOSIS — S51.811A SKIN TEAR OF FOREARM WITHOUT COMPLICATION, RIGHT, INITIAL ENCOUNTER: ICD-10-CM

## 2025-05-14 DIAGNOSIS — L97.312 ANKLE ULCER, RIGHT, WITH FAT LAYER EXPOSED (HCC): Primary | ICD-10-CM

## 2025-05-14 PROCEDURE — 87075 CULTR BACTERIA EXCEPT BLOOD: CPT

## 2025-05-14 PROCEDURE — 87186 SC STD MICRODIL/AGAR DIL: CPT

## 2025-05-14 PROCEDURE — 87205 SMEAR GRAM STAIN: CPT

## 2025-05-14 PROCEDURE — 99213 OFFICE O/P EST LOW 20 MIN: CPT

## 2025-05-14 PROCEDURE — 87070 CULTURE OTHR SPECIMN AEROBIC: CPT

## 2025-05-14 PROCEDURE — 87077 CULTURE AEROBIC IDENTIFY: CPT

## 2025-05-14 RX ORDER — NEOMYCIN/BACITRACIN/POLYMYXINB 3.5-400-5K
OINTMENT (GRAM) TOPICAL ONCE
OUTPATIENT
Start: 2025-05-14 | End: 2025-05-14

## 2025-05-14 RX ORDER — GINSENG 100 MG
CAPSULE ORAL ONCE
Status: CANCELLED | OUTPATIENT
Start: 2025-05-14 | End: 2025-05-14

## 2025-05-14 RX ORDER — MUPIROCIN 20 MG/G
OINTMENT TOPICAL ONCE
Status: CANCELLED | OUTPATIENT
Start: 2025-05-14 | End: 2025-05-14

## 2025-05-14 RX ORDER — NEOMYCIN/BACITRACIN/POLYMYXINB 3.5-400-5K
OINTMENT (GRAM) TOPICAL ONCE
Status: CANCELLED | OUTPATIENT
Start: 2025-05-14 | End: 2025-05-14

## 2025-05-14 RX ORDER — CLOPIDOGREL BISULFATE 75 MG/1
TABLET ORAL
COMMUNITY
Start: 2025-05-10

## 2025-05-14 RX ORDER — LIDOCAINE 50 MG/G
OINTMENT TOPICAL ONCE
OUTPATIENT
Start: 2025-05-14 | End: 2025-05-14

## 2025-05-14 RX ORDER — LIDOCAINE 40 MG/G
CREAM TOPICAL ONCE
Status: CANCELLED | OUTPATIENT
Start: 2025-05-14 | End: 2025-05-14

## 2025-05-14 RX ORDER — GINSENG 100 MG
CAPSULE ORAL ONCE
OUTPATIENT
Start: 2025-05-14 | End: 2025-05-14

## 2025-05-14 RX ORDER — SODIUM CHLOR/HYPOCHLOROUS ACID 0.033 %
SOLUTION, IRRIGATION IRRIGATION ONCE
Status: CANCELLED | OUTPATIENT
Start: 2025-05-14 | End: 2025-05-14

## 2025-05-14 RX ORDER — PANTOPRAZOLE SODIUM 40 MG/1
TABLET, DELAYED RELEASE ORAL
COMMUNITY
Start: 2025-05-10

## 2025-05-14 RX ORDER — TRIAMCINOLONE ACETONIDE 1 MG/G
OINTMENT TOPICAL ONCE
OUTPATIENT
Start: 2025-05-14 | End: 2025-05-14

## 2025-05-14 RX ORDER — GENTAMICIN SULFATE 1 MG/G
OINTMENT TOPICAL ONCE
Status: CANCELLED | OUTPATIENT
Start: 2025-05-14 | End: 2025-05-14

## 2025-05-14 RX ORDER — BETAMETHASONE DIPROPIONATE 0.5 MG/G
CREAM TOPICAL ONCE
OUTPATIENT
Start: 2025-05-14 | End: 2025-05-14

## 2025-05-14 RX ORDER — TRIAMCINOLONE ACETONIDE 1 MG/G
OINTMENT TOPICAL ONCE
Status: CANCELLED | OUTPATIENT
Start: 2025-05-14 | End: 2025-05-14

## 2025-05-14 RX ORDER — LIDOCAINE HYDROCHLORIDE 20 MG/ML
JELLY TOPICAL ONCE
OUTPATIENT
Start: 2025-05-14 | End: 2025-05-14

## 2025-05-14 RX ORDER — MUPIROCIN 20 MG/G
OINTMENT TOPICAL ONCE
OUTPATIENT
Start: 2025-05-14 | End: 2025-05-14

## 2025-05-14 RX ORDER — GENTAMICIN SULFATE 1 MG/G
OINTMENT TOPICAL ONCE
OUTPATIENT
Start: 2025-05-14 | End: 2025-05-14

## 2025-05-14 RX ORDER — LIDOCAINE HYDROCHLORIDE 40 MG/ML
SOLUTION TOPICAL ONCE
OUTPATIENT
Start: 2025-05-14 | End: 2025-05-14

## 2025-05-14 RX ORDER — BACITRACIN ZINC AND POLYMYXIN B SULFATE 500; 1000 [USP'U]/G; [USP'U]/G
OINTMENT TOPICAL ONCE
OUTPATIENT
Start: 2025-05-14 | End: 2025-05-14

## 2025-05-14 RX ORDER — LIDOCAINE HYDROCHLORIDE 20 MG/ML
JELLY TOPICAL ONCE
Status: CANCELLED | OUTPATIENT
Start: 2025-05-14 | End: 2025-05-14

## 2025-05-14 RX ORDER — LIDOCAINE 40 MG/G
CREAM TOPICAL ONCE
OUTPATIENT
Start: 2025-05-14 | End: 2025-05-14

## 2025-05-14 RX ORDER — BETAMETHASONE DIPROPIONATE 0.5 MG/G
CREAM TOPICAL ONCE
Status: CANCELLED | OUTPATIENT
Start: 2025-05-14 | End: 2025-05-14

## 2025-05-14 RX ORDER — SODIUM CHLOR/HYPOCHLOROUS ACID 0.033 %
SOLUTION, IRRIGATION IRRIGATION ONCE
OUTPATIENT
Start: 2025-05-14 | End: 2025-05-14

## 2025-05-14 RX ORDER — BACITRACIN ZINC AND POLYMYXIN B SULFATE 500; 1000 [USP'U]/G; [USP'U]/G
OINTMENT TOPICAL ONCE
Status: CANCELLED | OUTPATIENT
Start: 2025-05-14 | End: 2025-05-14

## 2025-05-14 RX ORDER — CLOBETASOL PROPIONATE 0.5 MG/G
OINTMENT TOPICAL ONCE
OUTPATIENT
Start: 2025-05-14 | End: 2025-05-14

## 2025-05-14 RX ORDER — LIDOCAINE HYDROCHLORIDE 40 MG/ML
SOLUTION TOPICAL ONCE
Status: CANCELLED | OUTPATIENT
Start: 2025-05-14 | End: 2025-05-14

## 2025-05-14 RX ORDER — CLOBETASOL PROPIONATE 0.5 MG/G
OINTMENT TOPICAL ONCE
Status: CANCELLED | OUTPATIENT
Start: 2025-05-14 | End: 2025-05-14

## 2025-05-14 RX ORDER — LIDOCAINE 50 MG/G
OINTMENT TOPICAL ONCE
Status: CANCELLED | OUTPATIENT
Start: 2025-05-14 | End: 2025-05-14

## 2025-05-14 ASSESSMENT — PAIN SCALES - GENERAL: PAINLEVEL_OUTOF10: 6

## 2025-05-14 ASSESSMENT — PAIN DESCRIPTION - LOCATION: LOCATION: ANKLE

## 2025-05-14 ASSESSMENT — PAIN DESCRIPTION - DESCRIPTORS: DESCRIPTORS: ACHING

## 2025-05-14 ASSESSMENT — PAIN DESCRIPTION - ORIENTATION: ORIENTATION: LEFT

## 2025-05-14 NOTE — PATIENT INSTRUCTIONS
Discharge Instructions for  [facility]  [address] [address]  Phone: [phone] Fax: [phone]    NAME:  Chanelle Clay  YOB: 1950  MEDICAL RECORD NUMBER:  [id number]  DATE:  May 14, 2025     [facility]  WOUND CARE ORDERS:                                   Right dorsal foot and left lateral ankle wound- Cleanse with baby shampoo. Allow to lather. Rinse and pat dry. Apply multilayered Xeroform. Cover with silicone border foam (zetuvit border okay to use). Remove for showers. Change after showers. Change three times a week.       Activity:  [x] Elevate leg(s) above the level of the heart when sitting.  [x] Avoid prolonged standing in one place.   [x] Do no get dressing/wrap wet.       Dietary:  [x] Diet as tolerated      [] Diabetic Diet            [x] Increase Protein: examples (Meat, cheese, eggs, greek yogurt, fish, nuts)          [] Дмитрий Therapeutic Nutrition Powder  [] Other:    Return Appointment:  [x] Return Appointment: With Dr. Weber in 2 Weeks  [] Nurse Visit   [] Ordered tests:     Wound Care Center Information: Should you experience any significant changes in your wound(s) or have questions about your wound care, please contact the [facility] MONDAY - THURSDAY 8:00AM - 4:00PM and FRIDAY 8:00AM - NOON at the number listed above. If you need help with your wound outside these hours and cannot wait until we are again available, contact your PCP or go to the hospital emergency room.      PLEASE NOTE: IF YOU ARE UNABLE TO OBTAIN WOUND SUPPLIES, CONTINUE TO USE THE SUPPLIES YOU HAVE AVAILABLE UNTIL YOU ARE ABLE TO REACH US. IT IS MOST IMPORTANT TO KEEP THE WOUND COVERED AT ALL TIMES.     Physician Signature:_______________________    Date: ___________ Time:  ____________

## 2025-05-14 NOTE — FLOWSHEET NOTE
Orientation Left   Pain Descriptors Aching     BP (!) 86/61   Pulse 64   Temp 97.4 °F (36.3 °C) (Temporal)   Resp 16

## 2025-05-14 NOTE — PROGRESS NOTES
Wound Center  Progress Note / Procedure Note      Chief Complaint:  Chanelle Clay is a 74 y.o. female  with Right foot and ankle wound since February 2023. Here for follow up of 2 wounds and follow up for chronic callus .    ***214  Mini stroke starting speech therapy tomorrow-outpatient  [facility] rehome health  Severe pain left ankle, rule out gout refer to PCP for pain and gout workup and x-ray  Xeroform  Culture done  Follow-up 2 weeks with Dr. Weber  Assessment/Plan     74 y.o. female with     -R ankle anterior chronic ulcer.  Re-opened  Re-opened  Resume using blastx    -R foot anterior chronic ulcer.  Periwound discolored  Full thickness  Smaller, healing well  Continue blastx    Left ankle  Full thickness  Slough/granular  Much much smaller than last visit, periwound also looks healthier  Culture reviewed with patient-only oral choices I have his floroquinolone and Bactrim.  Wound is also resistant to gentamicin, so the topical gentamicin has also not effective against this particular bacteria.  Patient is allergic to Bactrim.  Discussed options:  1-As wound is getting better, it is one third of the size it was last week, suggest watching and seeing how the wound does, especially given pain is improved and there are no signs of systemic or local infection.  2-Other options include going to the ER to get started on IV antibiotics  3- Or referring to ID for eval and starting on IV antibiotics  For now patient has chosen to watch and wait, however she will think about it further with her sister.  Mr. Felix is ready    Leg swelling  Elevate  Compression   venous reflux test normal        -Nicotine dependence  Aware of Effects of nicotine on wound healing-   Has cut back from 1.5ppd to 10 cig/day      -Vascular  Normal ABIs/TBIs/ in April 2023          Following discussed with patient / sister  Needs :  Serial debridement- prn    Good local wound care     Dressing:  blastx to right foot

## 2025-05-16 LAB
BACTERIA SPEC CULT: ABNORMAL
BACTERIA SPEC CULT: NORMAL
GRAM STN SPEC: ABNORMAL
GRAM STN SPEC: ABNORMAL
SERVICE CMNT-IMP: ABNORMAL
SERVICE CMNT-IMP: NORMAL

## 2025-05-21 RX ORDER — MUPIROCIN 20 MG/G
OINTMENT TOPICAL
Qty: 22 G | Refills: 0 | Status: SHIPPED | OUTPATIENT
Start: 2025-05-21 | End: 2025-05-28

## 2025-05-21 NOTE — PROGRESS NOTES
Wound Center    Culture LIGHT STAPHYLOCOCCUS PSEUDOINTERMEDIUS   Seeing ID next week  Topical mupirocin until then

## 2025-05-22 ENCOUNTER — TELEPHONE (OUTPATIENT)
Facility: HOSPITAL | Age: 75
End: 2025-05-22

## 2025-05-22 DIAGNOSIS — S51.811A SKIN TEAR OF FOREARM WITHOUT COMPLICATION, RIGHT, INITIAL ENCOUNTER: ICD-10-CM

## 2025-05-22 DIAGNOSIS — L97.312 ANKLE ULCER, RIGHT, WITH FAT LAYER EXPOSED (HCC): Primary | ICD-10-CM

## 2025-05-22 DIAGNOSIS — L97.512 RIGHT FOOT ULCER, WITH FAT LAYER EXPOSED (HCC): ICD-10-CM

## 2025-05-22 DIAGNOSIS — S51.001A ELBOW WOUND, RIGHT, INITIAL ENCOUNTER: ICD-10-CM

## 2025-05-22 NOTE — TELEPHONE ENCOUNTER
Spoke with patient; verified . Instructed MD has ordered mupirocin ointment to the [facility] pharmacy on [address]. Pt verifies this is her pharmacy. Instructed new wound care to Left ankle is mupirocin, followed by xeroform and a silicone border foam, change 3x/week as per Dr. Gill's verbal order. Verbalizes understanding. Questions answered. No further needs.

## 2025-06-04 ENCOUNTER — TELEPHONE (OUTPATIENT)
Facility: HOSPITAL | Age: 75
End: 2025-06-04

## 2025-06-04 DIAGNOSIS — S51.811A SKIN TEAR OF FOREARM WITHOUT COMPLICATION, RIGHT, INITIAL ENCOUNTER: ICD-10-CM

## 2025-06-04 DIAGNOSIS — L97.512 RIGHT FOOT ULCER, WITH FAT LAYER EXPOSED (HCC): ICD-10-CM

## 2025-06-04 DIAGNOSIS — S51.001A ELBOW WOUND, RIGHT, INITIAL ENCOUNTER: ICD-10-CM

## 2025-06-04 DIAGNOSIS — L97.312 ANKLE ULCER, RIGHT, WITH FAT LAYER EXPOSED (HCC): Primary | ICD-10-CM

## 2025-06-04 NOTE — TELEPHONE ENCOUNTER
Called patient to schedule follow up appointment. No answer. Left voicemail to call if appt is needed.

## (undated) DEVICE — SOLIDIFIER FLD 2OZ 1500CC N DISINF IN BTL DISP SAFESORB

## (undated) DEVICE — NEONATAL-ADULT SPO2 SENSOR: Brand: NELLCOR

## (undated) DEVICE — TOWEL 4 PLY TISS 19X30 SUE WHT

## (undated) DEVICE — SYR 3ML LL TIP 1/10ML GRAD --

## (undated) DEVICE — BASIN EMSIS 16OZ GRAPHITE PLAS KID SHP MOLD GRAD FOR ORAL

## (undated) DEVICE — ELECTRODE,RADIOTRANSLUCENT,FOAM,5PK: Brand: MEDLINE

## (undated) DEVICE — 1200 GUARD II KIT W/5MM TUBE W/O VAC TUBE: Brand: GUARDIAN

## (undated) DEVICE — Device

## (undated) DEVICE — CATH IV AUTOGRD BC PNK 20GA 25 -- INSYTE

## (undated) DEVICE — NEEDLE HYPO 18GA L1.5IN PNK S STL HUB POLYPR SHLD REG BVL

## (undated) DEVICE — Z DISCONTINUED PER MEDLINE LINE GAS SAMPLING O2/CO2 LNG AD 13 FT NSL W/ TBNG FILTERLINE

## (undated) DEVICE — SYR 10ML LUER LOK 1/5ML GRAD --

## (undated) DEVICE — SET ADMIN 16ML TBNG L100IN 2 Y INJ SITE IV PIGGY BK DISP